# Patient Record
Sex: MALE | Race: WHITE | NOT HISPANIC OR LATINO | Employment: FULL TIME | URBAN - METROPOLITAN AREA
[De-identification: names, ages, dates, MRNs, and addresses within clinical notes are randomized per-mention and may not be internally consistent; named-entity substitution may affect disease eponyms.]

---

## 2017-01-13 ENCOUNTER — LAB VISIT (OUTPATIENT)
Dept: LAB | Facility: HOSPITAL | Age: 37
End: 2017-01-13
Attending: INTERNAL MEDICINE
Payer: OTHER GOVERNMENT

## 2017-01-13 ENCOUNTER — INITIAL CONSULT (OUTPATIENT)
Dept: GASTROENTEROLOGY | Facility: CLINIC | Age: 37
End: 2017-01-13
Payer: OTHER GOVERNMENT

## 2017-01-13 ENCOUNTER — PATIENT MESSAGE (OUTPATIENT)
Dept: GASTROENTEROLOGY | Facility: CLINIC | Age: 37
End: 2017-01-13

## 2017-01-13 VITALS — WEIGHT: 210.13 LBS | BODY MASS INDEX: 33.77 KG/M2 | HEIGHT: 66 IN

## 2017-01-13 DIAGNOSIS — K52.9 CHRONIC DIARRHEA: ICD-10-CM

## 2017-01-13 DIAGNOSIS — R74.01 ELEVATED ALT MEASUREMENT: ICD-10-CM

## 2017-01-13 DIAGNOSIS — K52.9 CHRONIC DIARRHEA: Primary | ICD-10-CM

## 2017-01-13 LAB
ALBUMIN SERPL BCP-MCNC: 3.9 G/DL
ALP SERPL-CCNC: 81 U/L
ALT SERPL W/O P-5'-P-CCNC: 46 U/L
ANION GAP SERPL CALC-SCNC: 11 MMOL/L
AST SERPL-CCNC: 28 U/L
BILIRUB SERPL-MCNC: 1.3 MG/DL
BUN SERPL-MCNC: 14 MG/DL
CALCIUM SERPL-MCNC: 9.2 MG/DL
CHLORIDE SERPL-SCNC: 104 MMOL/L
CO2 SERPL-SCNC: 26 MMOL/L
CREAT SERPL-MCNC: 1.2 MG/DL
EST. GFR  (AFRICAN AMERICAN): >60 ML/MIN/1.73 M^2
EST. GFR  (NON AFRICAN AMERICAN): >60 ML/MIN/1.73 M^2
GLUCOSE SERPL-MCNC: 71 MG/DL
POTASSIUM SERPL-SCNC: 4 MMOL/L
PROT SERPL-MCNC: 7.4 G/DL
SODIUM SERPL-SCNC: 141 MMOL/L
T4 FREE SERPL-MCNC: 0.87 NG/DL
TSH SERPL DL<=0.005 MIU/L-ACNC: 0.39 UIU/ML

## 2017-01-13 PROCEDURE — 84443 ASSAY THYROID STIM HORMONE: CPT

## 2017-01-13 PROCEDURE — 86706 HEP B SURFACE ANTIBODY: CPT

## 2017-01-13 PROCEDURE — 80053 COMPREHEN METABOLIC PANEL: CPT

## 2017-01-13 PROCEDURE — 86790 VIRUS ANTIBODY NOS: CPT

## 2017-01-13 PROCEDURE — 36415 COLL VENOUS BLD VENIPUNCTURE: CPT

## 2017-01-13 PROCEDURE — 86704 HEP B CORE ANTIBODY TOTAL: CPT

## 2017-01-13 PROCEDURE — 86709 HEPATITIS A IGM ANTIBODY: CPT

## 2017-01-13 PROCEDURE — 86803 HEPATITIS C AB TEST: CPT

## 2017-01-13 PROCEDURE — 84439 ASSAY OF FREE THYROXINE: CPT

## 2017-01-13 PROCEDURE — 99214 OFFICE O/P EST MOD 30 MIN: CPT | Mod: S$PBB,,, | Performed by: INTERNAL MEDICINE

## 2017-01-13 PROCEDURE — 99213 OFFICE O/P EST LOW 20 MIN: CPT | Mod: PBBFAC | Performed by: INTERNAL MEDICINE

## 2017-01-13 PROCEDURE — 99999 PR PBB SHADOW E&M-EST. PATIENT-LVL III: CPT | Mod: PBBFAC,,, | Performed by: INTERNAL MEDICINE

## 2017-01-13 PROCEDURE — 86705 HEP B CORE ANTIBODY IGM: CPT

## 2017-01-13 RX ORDER — SODIUM, POTASSIUM,MAG SULFATES 17.5-3.13G
1 SOLUTION, RECONSTITUTED, ORAL ORAL AS DIRECTED
Qty: 1 BOTTLE | Refills: 0 | Status: ON HOLD | OUTPATIENT
Start: 2017-01-13 | End: 2017-01-23 | Stop reason: HOSPADM

## 2017-01-13 NOTE — PROGRESS NOTES
Subjective:    PCP: Janice Vlaverde MD    Referring physician: Apoorva Britt      Patient ID: David Petersen is a 36 y.o. male.    Chief Complaint: Diarrhea and Abdominal Cramping      HPI   David Petersen is a 36 y.o. /White male here today for diarrhea    Diarrhea  Patient complains of diarrhea. Symptoms have been present for approximately several years. The symptoms are unchanged. Stool frequency is approximately 5 per day. Patient estimates stool volume to be 1-2 cups per bowel movement. Diarrhea does occur at night. The patient has noted no bleeding associated with bowel movements. The also patient reports the following symptoms: abdominal cramping relieved by defecation. The patient denies the following symptoms: abdominal distension, borborygmi, fecal incontinence, fever, greasy stool, joint aches, mucus with stools and weight loss. The patient currently denies significant abdominal pain or discomfort.   Relationship to food: the diarrhea is made worse by foods containing: all foods. Relationship to medications: the patient denies any relationship to medications. Other risk factors: emotional stress. Therapy tried so far: OTC antidiarrheal: Imodium at times with response, results good. Work up so far: stool for occult blood, result: negative, stool cultures negative, fecal leukocytes negative (2014). Celiac disease panel in 2014 was negative     He has mildly elevated ALT on his labs. He is overweight. No IVDA, tattoos, blood transfusions. Denies family history of liver disease, prior surgery   Is . No OTC herbals or green tea  Rare alcohol intake.   No known family history of IBD, celiac disease or GI malignancy.        Past Medical History   Diagnosis Date    Anxiety     Arthritis     Chronic diarrhea 2/11/2014    Jumper's knee        Past Surgical History   Procedure Laterality Date    Tonsillectomy         Family History   Problem Relation Age of Onset    Cancer Mother      Diabetes Maternal Grandmother     Diabetes Maternal Grandfather     Diabetes Paternal Grandmother     Diabetes Paternal Grandfather        Social History     Social History    Marital status:      Spouse name: N/A    Number of children: N/A    Years of education: N/A     Occupational History     [employer]     Social History Main Topics    Smoking status: Never Smoker    Smokeless tobacco: Never Used    Alcohol use Yes    Drug use: No    Sexual activity: Yes     Partners: Female     Birth control/ protection: None     Other Topics Concern    None     Social History Narrative       Review of patient's allergies indicates:   Allergen Reactions    Penicillins        Current Outpatient Prescriptions   Medication Sig Dispense Refill    alprazolam (XANAX) 0.5 MG tablet TAKE 1 TABLET BY MOUTH AS NEEDED FOR ANXIETY  2    buPROPion (WELLBUTRIN XL) 150 MG TB24 tablet Take 150 mg by mouth once daily.  2    sertraline (ZOLOFT) 50 MG tablet TAKE 1 TABLET BY MOUTH EVERY DAY WITH 100MG  2    azithromycin (Z-CARLOS) 250 MG tablet Follow instructions on pack. 6 tablet 0    ondansetron (ZOFRAN-ODT) 8 MG TbDL Take 1 tablet (8 mg total) by mouth 3 (three) times daily. 15 tablet 0    potassium chloride SA (K-DUR,KLOR-CON) 20 MEQ tablet Take 1 tab po once daily for 3 days 3 tablet 0     No current facility-administered medications for this visit.        Review of Systems   Constitutional: Negative for activity change, appetite change, chills, diaphoresis, fatigue and fever.   HENT: Negative for congestion, ear pain, facial swelling, mouth sores, nosebleeds, rhinorrhea, sore throat and voice change.    Eyes: Negative for photophobia, pain, discharge, redness and visual disturbance.   Respiratory: Negative for apnea, cough, choking, chest tightness, shortness of breath and wheezing.    Cardiovascular: Negative for chest pain, palpitations and leg swelling.   Gastrointestinal:        As per HPI   Genitourinary: Negative for  "decreased urine volume, difficulty urinating, dysuria, frequency and hematuria.   Musculoskeletal: Negative for arthralgias, back pain, myalgias, neck pain and neck stiffness.   Skin: Negative for pallor and rash.   Neurological: Negative for tremors, seizures, syncope, weakness and headaches.   Hematological: Negative for adenopathy. Does not bruise/bleed easily.   Psychiatric/Behavioral: Negative for behavioral problems, confusion, hallucinations and sleep disturbance. The patient is not nervous/anxious.        Objective:     Visit Vitals    Ht 5' 6" (1.676 m)    Wt 95.3 kg (210 lb 1.6 oz)    BMI 33.91 kg/m2     Wt Readings from Last 1 Encounters:   01/13/17 1014 95.3 kg (210 lb 1.6 oz)       Physical Exam   Constitutional: He is oriented to person, place, and time. He appears well-developed and well-nourished. No distress.   HENT:   Head: Normocephalic and atraumatic.   Mouth/Throat: Oropharynx is clear and moist.   Eyes: Conjunctivae and EOM are normal. Pupils are equal, round, and reactive to light. No scleral icterus.   Neck: Normal range of motion. Neck supple.   Cardiovascular: Normal rate, regular rhythm and normal heart sounds.    Pulmonary/Chest: Effort normal.   Abdominal: Soft. Bowel sounds are normal. He exhibits no distension and no mass. There is no tenderness. There is no rebound and no guarding. No hernia.   Musculoskeletal: Normal range of motion. He exhibits no edema.   Lymphadenopathy:     He has no cervical adenopathy.   Neurological: He is alert and oriented to person, place, and time. He has normal reflexes.   Skin: Skin is warm and dry. No rash noted. He is not diaphoretic. No erythema. No pallor.   Psychiatric: He has a normal mood and affect. His behavior is normal. Judgment and thought content normal.       Lab Results   Component Value Date    WBC 7.50 09/28/2016    HGB 15.2 09/28/2016    HCT 45.5 09/28/2016    MCV 90 09/28/2016     09/28/2016       Chemistry        Component " Value Date/Time     11/04/2016 1337    K 3.3 (L) 11/04/2016 1337     11/04/2016 1337    CO2 27 11/04/2016 1337    BUN 9 11/04/2016 1337    CREATININE 1.1 11/04/2016 1337    GLU 68 (L) 11/04/2016 1337        Component Value Date/Time    CALCIUM 8.8 11/04/2016 1337    ALKPHOS 71 09/28/2016 1128    AST 30 09/28/2016 1128    ALT 55 (H) 09/28/2016 1128    BILITOT 0.6 09/28/2016 1128          No results found for: APTT  No results found for: INR, PROTIME    No components found for: [id number]    No results found for: TSH  No results found for: HGBA1C    Lab Results   Component Value Date    AMYLASE 36 09/28/2016     Lab Results   Component Value Date    LIPASE 16 09/28/2016       No results found for: HAV, HEPAIGM, HEPBIGM, HEPBCAB, HBEAG, HEPCAB  No results found for: PPD    No results found for: OCCULTBLOOD    No results found for: IRON, TIBC, FERRITIN, SATURATEDIRO    Assessment:      1. Chronic diarrhea    2. Elevated ALT measurement     Likely IBS, diarrhea predominant       Plan:     Chronic diarrhea  -     Stool Exam-Ova,Cysts,Parasites; Future; Expected date: 1/13/17  -     Clostridium difficile EIA; Future; Expected date: 1/13/17  -     WBC, Stool; Future; Expected date: 1/13/17  -     TSH; Future; Expected date: 1/13/17  -     Stool culture; Future; Expected date: 1/13/17  -     Case request GI: COLONOSCOPY  -     sodium,potassium,mag sulfates (SUPREP BOWEL PREP KIT) 17.5-3.13-1.6 gram SolR; Take 1 kit by mouth as directed. For colonoscopy preparation  Dispense: 1 Bottle; Refill: 0    Elevated ALT measurement  -     Comprehensive metabolic panel; Future; Expected date: 1/13/17  -     US Abdomen Complete; Future; Expected date: 1/13/17  -     HEPATITIS B CORE ANTIBODY, TOTAL; Future; Expected date: 1/13/17  -     HEPATITIS B CORE ANTIBODY, IGM; Future; Expected date: 1/13/17  -     HEPATITIS B SURFACE ANTIBODY; Future; Expected date: 1/13/17  -     HEPATITIS C ANTIBODY; Future; Expected date: 1/13/17  -      HEPATITIS A ANTIBODY, IGM; Future; Expected date: 1/13/17  -     Hepatitis A antibody, IgG; Future; Expected date: 1/13/17          Return in about 6 weeks (around 2/24/2017).      Adrianna Hicks MD

## 2017-01-13 NOTE — LETTER
January 23, 2017      Apoorva Britt NP  [address] [facility]  1st Floor  [facility] [address]           [facility]  [address] [place] [id number]  Phone: [phone]  Fax: [phone]          Patient: David Petersen   MR Number: [id number]   YOB: 1980   Date of Visit: 1/13/2017       Dear Apoorva Britt:    Thank you for referring David Petersen to me for evaluation. Attached you will find relevant portions of my assessment and plan of care.    If you have questions, please do not hesitate to call me. I look forward to following David Petersen along with you.    Sincerely,    Adrianna Hicks MD    Enclosure  CC:  No Recipients    If you would like to receive this communication electronically, please contact [email or web address] or [phone] to request more information on [employer] access.    For providers and/or their staff who would like to refer a patient to [facility], please contact us through our one-stop-shop provider referral line, [facility], at [phone].    If you feel you have received this communication in error or would no longer like to receive these types of communications, please e-mail [email or web address]

## 2017-01-16 ENCOUNTER — HOSPITAL ENCOUNTER (OUTPATIENT)
Dept: RADIOLOGY | Facility: HOSPITAL | Age: 37
Discharge: HOME | End: 2017-01-16
Attending: INTERNAL MEDICINE
Payer: OTHER GOVERNMENT

## 2017-01-16 DIAGNOSIS — R74.01 ELEVATED ALT MEASUREMENT: ICD-10-CM

## 2017-01-16 LAB
HAV IGM SERPL QL IA: NEGATIVE
HBV CORE AB SERPL QL IA: NEGATIVE
HBV CORE IGM SERPL QL IA: NEGATIVE
HBV SURFACE AB SER-ACNC: NEGATIVE M[IU]/ML
HCV AB SERPL QL IA: NEGATIVE
HEPATITIS A ANTIBODY, IGG: POSITIVE

## 2017-01-16 PROCEDURE — 76700 US EXAM ABDOM COMPLETE: CPT | Mod: 26,,, | Performed by: RADIOLOGY

## 2017-01-16 PROCEDURE — 76700 US EXAM ABDOM COMPLETE: CPT | Mod: TC,PO

## 2017-01-17 ENCOUNTER — PATIENT MESSAGE (OUTPATIENT)
Dept: GASTROENTEROLOGY | Facility: CLINIC | Age: 37
End: 2017-01-17

## 2017-01-18 DIAGNOSIS — R19.7 DIARRHEA, UNSPECIFIED TYPE: Primary | ICD-10-CM

## 2017-01-20 ENCOUNTER — TELEPHONE (OUTPATIENT)
Dept: GASTROENTEROLOGY | Facility: CLINIC | Age: 37
End: 2017-01-20

## 2017-01-20 NOTE — TELEPHONE ENCOUNTER
----- Message from Adrianna Hicks MD sent at 1/18/2017  8:44 AM CST -----  Please schedule labs T3/T4 for the day he comes for colonoscopy. Thanks

## 2017-01-20 NOTE — TELEPHONE ENCOUNTER
Pt notified of lab appt at [facility] on 1/23/17 before Colonoscopy. Pt verbalized understanding.

## 2017-01-23 ENCOUNTER — ANESTHESIA (OUTPATIENT)
Dept: ENDOSCOPY | Facility: HOSPITAL | Age: 37
End: 2017-01-23
Payer: OTHER GOVERNMENT

## 2017-01-23 ENCOUNTER — HOSPITAL ENCOUNTER (OUTPATIENT)
Facility: HOSPITAL | Age: 37
Discharge: HOME | End: 2017-01-23
Attending: INTERNAL MEDICINE | Admitting: INTERNAL MEDICINE
Payer: OTHER GOVERNMENT

## 2017-01-23 ENCOUNTER — SURGERY (OUTPATIENT)
Age: 37
End: 2017-01-23

## 2017-01-23 ENCOUNTER — ANESTHESIA EVENT (OUTPATIENT)
Dept: ENDOSCOPY | Facility: HOSPITAL | Age: 37
End: 2017-01-23
Payer: OTHER GOVERNMENT

## 2017-01-23 VITALS
BODY MASS INDEX: 33.27 KG/M2 | RESPIRATION RATE: 19 BRPM | HEART RATE: 72 BPM | DIASTOLIC BLOOD PRESSURE: 70 MMHG | HEIGHT: 66 IN | WEIGHT: 207 LBS | TEMPERATURE: 98 F | SYSTOLIC BLOOD PRESSURE: 111 MMHG | OXYGEN SATURATION: 98 % | RESPIRATION RATE: 14 BRPM

## 2017-01-23 DIAGNOSIS — K52.9 CHRONIC DIARRHEA: ICD-10-CM

## 2017-01-23 PROCEDURE — 88305 TISSUE EXAM BY PATHOLOGIST: CPT | Performed by: PATHOLOGY

## 2017-01-23 PROCEDURE — 37000008 HC ANESTHESIA 1ST 15 MINUTES: Performed by: INTERNAL MEDICINE

## 2017-01-23 PROCEDURE — 27201012 HC FORCEPS, HOT/COLD, DISP: Performed by: INTERNAL MEDICINE

## 2017-01-23 PROCEDURE — 63600175 PHARM REV CODE 636 W HCPCS: Performed by: NURSE ANESTHETIST, CERTIFIED REGISTERED

## 2017-01-23 PROCEDURE — 45380 COLONOSCOPY AND BIOPSY: CPT | Performed by: INTERNAL MEDICINE

## 2017-01-23 PROCEDURE — 25000003 PHARM REV CODE 250: Performed by: NURSE ANESTHETIST, CERTIFIED REGISTERED

## 2017-01-23 PROCEDURE — 25000003 PHARM REV CODE 250: Performed by: INTERNAL MEDICINE

## 2017-01-23 PROCEDURE — 88305 TISSUE EXAM BY PATHOLOGIST: CPT | Mod: 26,,, | Performed by: PATHOLOGY

## 2017-01-23 PROCEDURE — 37000009 HC ANESTHESIA EA ADD 15 MINS: Performed by: INTERNAL MEDICINE

## 2017-01-23 PROCEDURE — 45380 COLONOSCOPY AND BIOPSY: CPT | Mod: ,,, | Performed by: INTERNAL MEDICINE

## 2017-01-23 RX ORDER — SODIUM CHLORIDE, SODIUM LACTATE, POTASSIUM CHLORIDE, CALCIUM CHLORIDE 600; 310; 30; 20 MG/100ML; MG/100ML; MG/100ML; MG/100ML
INJECTION, SOLUTION INTRAVENOUS CONTINUOUS PRN
Status: DISCONTINUED | OUTPATIENT
Start: 2017-01-23 | End: 2017-01-23

## 2017-01-23 RX ORDER — SODIUM CHLORIDE, SODIUM LACTATE, POTASSIUM CHLORIDE, CALCIUM CHLORIDE 600; 310; 30; 20 MG/100ML; MG/100ML; MG/100ML; MG/100ML
INJECTION, SOLUTION INTRAVENOUS CONTINUOUS
Status: DISCONTINUED | OUTPATIENT
Start: 2017-01-23 | End: 2017-01-23 | Stop reason: HOSPADM

## 2017-01-23 RX ORDER — LIDOCAINE HCL/PF 100 MG/5ML
SYRINGE (ML) INTRAVENOUS
Status: DISCONTINUED | OUTPATIENT
Start: 2017-01-23 | End: 2017-01-23

## 2017-01-23 RX ORDER — PROPOFOL 10 MG/ML
INJECTION, EMULSION INTRAVENOUS
Status: DISCONTINUED | OUTPATIENT
Start: 2017-01-23 | End: 2017-01-23

## 2017-01-23 RX ADMIN — SODIUM CHLORIDE, SODIUM LACTATE, POTASSIUM CHLORIDE, AND CALCIUM CHLORIDE: .6; .31; .03; .02 INJECTION, SOLUTION INTRAVENOUS at 11:01

## 2017-01-23 RX ADMIN — PROPOFOL 40 MG: 10 INJECTION, EMULSION INTRAVENOUS at 02:01

## 2017-01-23 RX ADMIN — SODIUM CHLORIDE, SODIUM LACTATE, POTASSIUM CHLORIDE, AND CALCIUM CHLORIDE: 600; 310; 30; 20 INJECTION, SOLUTION INTRAVENOUS at 01:01

## 2017-01-23 RX ADMIN — PROPOFOL 60 MG: 10 INJECTION, EMULSION INTRAVENOUS at 01:01

## 2017-01-23 RX ADMIN — PROPOFOL 40 MG: 10 INJECTION, EMULSION INTRAVENOUS at 01:01

## 2017-01-23 RX ADMIN — LIDOCAINE HYDROCHLORIDE 50 MG: 20 INJECTION, SOLUTION INTRAVENOUS at 01:01

## 2017-01-23 RX ADMIN — PROPOFOL 140 MG: 10 INJECTION, EMULSION INTRAVENOUS at 01:01

## 2017-01-23 NOTE — H&P (VIEW-ONLY)
Subjective:    PCP: Janice Valverde MD    Referring physician: Apoorva Britt      Patient ID: David Petersen is a 36 y.o. male.    Chief Complaint: Diarrhea and Abdominal Cramping      HPI   David Petersen is a 36 y.o. /White male here today for diarrhea    Diarrhea  Patient complains of diarrhea. Symptoms have been present for approximately several years. The symptoms are unchanged. Stool frequency is approximately 5 per day. Patient estimates stool volume to be 1-2 cups per bowel movement. Diarrhea does occur at night. The patient has noted no bleeding associated with bowel movements. The also patient reports the following symptoms: abdominal cramping relieved by defecation. The patient denies the following symptoms: abdominal distension, borborygmi, fecal incontinence, fever, greasy stool, joint aches, mucus with stools and weight loss. The patient currently denies significant abdominal pain or discomfort.   Relationship to food: the diarrhea is made worse by foods containing: all foods. Relationship to medications: the patient denies any relationship to medications. Other risk factors: emotional stress. Therapy tried so far: OTC antidiarrheal: Imodium at times with response, results good. Work up so far: stool for occult blood, result: negative, stool cultures negative, fecal leukocytes negative (2014). Celiac disease panel in 2014 was negative     He has mildly elevated ALT on his labs. He is overweight. No IVDA, tattoos, blood transfusions. Denies family history of liver disease, prior surgery   Is . No OTC herbals or green tea  Rare alcohol intake.   No known family history of IBD, celiac disease or GI malignancy.        Past Medical History   Diagnosis Date    Anxiety     Arthritis     Chronic diarrhea 2/11/2014    Jumper's knee        Past Surgical History   Procedure Laterality Date    Tonsillectomy         Family History   Problem Relation Age of Onset    Cancer Mother      Diabetes Maternal Grandmother     Diabetes Maternal Grandfather     Diabetes Paternal Grandmother     Diabetes Paternal Grandfather        Social History     Social History    Marital status:      Spouse name: N/A    Number of children: N/A    Years of education: N/A     Occupational History     [employer]     Social History Main Topics    Smoking status: Never Smoker    Smokeless tobacco: Never Used    Alcohol use Yes    Drug use: No    Sexual activity: Yes     Partners: Female     Birth control/ protection: None     Other Topics Concern    None     Social History Narrative       Review of patient's allergies indicates:   Allergen Reactions    Penicillins        Current Outpatient Prescriptions   Medication Sig Dispense Refill    alprazolam (XANAX) 0.5 MG tablet TAKE 1 TABLET BY MOUTH AS NEEDED FOR ANXIETY  2    buPROPion (WELLBUTRIN XL) 150 MG TB24 tablet Take 150 mg by mouth once daily.  2    sertraline (ZOLOFT) 50 MG tablet TAKE 1 TABLET BY MOUTH EVERY DAY WITH 100MG  2    azithromycin (Z-CARLOS) 250 MG tablet Follow instructions on pack. 6 tablet 0    ondansetron (ZOFRAN-ODT) 8 MG TbDL Take 1 tablet (8 mg total) by mouth 3 (three) times daily. 15 tablet 0    potassium chloride SA (K-DUR,KLOR-CON) 20 MEQ tablet Take 1 tab po once daily for 3 days 3 tablet 0     No current facility-administered medications for this visit.        Review of Systems   Constitutional: Negative for activity change, appetite change, chills, diaphoresis, fatigue and fever.   HENT: Negative for congestion, ear pain, facial swelling, mouth sores, nosebleeds, rhinorrhea, sore throat and voice change.    Eyes: Negative for photophobia, pain, discharge, redness and visual disturbance.   Respiratory: Negative for apnea, cough, choking, chest tightness, shortness of breath and wheezing.    Cardiovascular: Negative for chest pain, palpitations and leg swelling.   Gastrointestinal:        As per HPI   Genitourinary: Negative for  "decreased urine volume, difficulty urinating, dysuria, frequency and hematuria.   Musculoskeletal: Negative for arthralgias, back pain, myalgias, neck pain and neck stiffness.   Skin: Negative for pallor and rash.   Neurological: Negative for tremors, seizures, syncope, weakness and headaches.   Hematological: Negative for adenopathy. Does not bruise/bleed easily.   Psychiatric/Behavioral: Negative for behavioral problems, confusion, hallucinations and sleep disturbance. The patient is not nervous/anxious.        Objective:     Visit Vitals    Ht 5' 6" (1.676 m)    Wt 95.3 kg (210 lb 1.6 oz)    BMI 33.91 kg/m2     Wt Readings from Last 1 Encounters:   01/13/17 1014 95.3 kg (210 lb 1.6 oz)       Physical Exam   Constitutional: He is oriented to person, place, and time. He appears well-developed and well-nourished. No distress.   HENT:   Head: Normocephalic and atraumatic.   Mouth/Throat: Oropharynx is clear and moist.   Eyes: Conjunctivae and EOM are normal. Pupils are equal, round, and reactive to light. No scleral icterus.   Neck: Normal range of motion. Neck supple.   Cardiovascular: Normal rate, regular rhythm and normal heart sounds.    Pulmonary/Chest: Effort normal.   Abdominal: Soft. Bowel sounds are normal. He exhibits no distension and no mass. There is no tenderness. There is no rebound and no guarding. No hernia.   Musculoskeletal: Normal range of motion. He exhibits no edema.   Lymphadenopathy:     He has no cervical adenopathy.   Neurological: He is alert and oriented to person, place, and time. He has normal reflexes.   Skin: Skin is warm and dry. No rash noted. He is not diaphoretic. No erythema. No pallor.   Psychiatric: He has a normal mood and affect. His behavior is normal. Judgment and thought content normal.       Lab Results   Component Value Date    WBC 7.50 09/28/2016    HGB 15.2 09/28/2016    HCT 45.5 09/28/2016    MCV 90 09/28/2016     09/28/2016       Chemistry        Component " Value Date/Time     11/04/2016 1337    K 3.3 (L) 11/04/2016 1337     11/04/2016 1337    CO2 27 11/04/2016 1337    BUN 9 11/04/2016 1337    CREATININE 1.1 11/04/2016 1337    GLU 68 (L) 11/04/2016 1337        Component Value Date/Time    CALCIUM 8.8 11/04/2016 1337    ALKPHOS 71 09/28/2016 1128    AST 30 09/28/2016 1128    ALT 55 (H) 09/28/2016 1128    BILITOT 0.6 09/28/2016 1128          No results found for: APTT  No results found for: INR, PROTIME    No components found for: [id number]    No results found for: TSH  No results found for: HGBA1C    Lab Results   Component Value Date    AMYLASE 36 09/28/2016     Lab Results   Component Value Date    LIPASE 16 09/28/2016       No results found for: HAV, HEPAIGM, HEPBIGM, HEPBCAB, HBEAG, HEPCAB  No results found for: PPD    No results found for: OCCULTBLOOD    No results found for: IRON, TIBC, FERRITIN, SATURATEDIRO    Assessment:      1. Chronic diarrhea    2. Elevated ALT measurement     Likely IBS, diarrhea predominant       Plan:     Chronic diarrhea  -     Stool Exam-Ova,Cysts,Parasites; Future; Expected date: 1/13/17  -     Clostridium difficile EIA; Future; Expected date: 1/13/17  -     WBC, Stool; Future; Expected date: 1/13/17  -     TSH; Future; Expected date: 1/13/17  -     Stool culture; Future; Expected date: 1/13/17  -     Case request GI: COLONOSCOPY  -     sodium,potassium,mag sulfates (SUPREP BOWEL PREP KIT) 17.5-3.13-1.6 gram SolR; Take 1 kit by mouth as directed. For colonoscopy preparation  Dispense: 1 Bottle; Refill: 0    Elevated ALT measurement  -     Comprehensive metabolic panel; Future; Expected date: 1/13/17  -     US Abdomen Complete; Future; Expected date: 1/13/17  -     HEPATITIS B CORE ANTIBODY, TOTAL; Future; Expected date: 1/13/17  -     HEPATITIS B CORE ANTIBODY, IGM; Future; Expected date: 1/13/17  -     HEPATITIS B SURFACE ANTIBODY; Future; Expected date: 1/13/17  -     HEPATITIS C ANTIBODY; Future; Expected date: 1/13/17  -      HEPATITIS A ANTIBODY, IGM; Future; Expected date: 1/13/17  -     Hepatitis A antibody, IgG; Future; Expected date: 1/13/17          Return in about 6 weeks (around 2/24/2017).      Adrianna Hicks MD

## 2017-01-23 NOTE — BRIEF OP NOTE
[facility]  Brief Operative Note     SUMMARY     Surgery Date: 1/23/2017     Surgeon(s) and Role:     * Adrianna Hicks MD - Primary    Assisting Surgeon: None    Pre-op Diagnosis:  Chronic diarrhea [K52.9]    Post-op Diagnosis:  Post-Op Diagnosis Codes:     * Chronic diarrhea [K52.9]    Procedure(s) (LRB):  COLONOSCOPY (N/A)    Anesthesia: Monitor Anesthesia Care    Description of the findings of the procedure: Procedure completed. See Procedure note for details.     Findings/Key Components:  Procedure completed. See Procedure note for details.     Prosthetic/Devices: None    Estimated Blood Loss: None         Specimens:   Specimen (12h ago through future)    Start     Ordered    01/23/17 1359  Specimen to Pathology - Surgery  Once     Comments:  Jar 1 random colon bx for diarrhea    01/23/17 1405          Discharge Note    SUMMARY     Admit Date: 1/23/2017    Discharge Date and Time: 1/23/2017    Hospital Course (synopsis of major diagnoses, care, treatment, and services provided during the course of the hospital stay): Procedure completed. See Procedure note for details.     Final Diagnosis: Post-Op Diagnosis Codes:     * Chronic diarrhea [K52.9]    Disposition: Discharge home when discharge criteria met.    Follow Up/Patient Instructions: With primary care physician as previously scheduled.    Medications:  Reconciled Home Medications: Current Discharge Medication List      CONTINUE these medications which have NOT CHANGED    Details   alprazolam (XANAX) 0.5 MG tablet TAKE 1 TABLET BY MOUTH AS NEEDED FOR ANXIETY  Refills: 2      azithromycin (Z-CARLOS) 250 MG tablet Follow instructions on pack.  Qty: 6 tablet, Refills: 0      buPROPion (WELLBUTRIN XL) 150 MG TB24 tablet Take 150 mg by mouth once daily.  Refills: 2      ondansetron (ZOFRAN-ODT) 8 MG TbDL Take 1 tablet (8 mg total) by mouth 3 (three) times daily.  Qty: 15 tablet, Refills: 0      potassium chloride SA (K-DUR,KLOR-CON) 20 MEQ  tablet Take 1 tab po once daily for 3 days  Qty: 3 tablet, Refills: 0    Associated Diagnoses: Hypokalemia      sertraline (ZOLOFT) 50 MG tablet TAKE 1 TABLET BY MOUTH EVERY DAY WITH 100MG  Refills: 2         STOP taking these medications       sodium,potassium,mag sulfates (SUPREP BOWEL PREP KIT) 17.5-3.13-1.6 gram SolR Comments:   Reason for Stopping:               Discharge Procedure Orders  Diet general     Activity as tolerated       Follow-up Information     Follow up with Janice Valverde MD.    Specialty:  Family Medicine    Contact information:    [address] [address] [address] [phone]

## 2017-01-23 NOTE — ANESTHESIA POSTPROCEDURE EVALUATION
"Anesthesia Post Evaluation    Patient: David Petersen    Procedure(s) Performed: Procedure(s) (LRB):  COLONOSCOPY (N/A)    Final Anesthesia Type: MAC  Patient location during evaluation: PACU  Patient participation: Yes- Able to Participate  Level of consciousness: awake and alert and oriented  Post-procedure vital signs: reviewed and stable  Pain management: adequate  Airway patency: patent  PONV status at discharge: No PONV  Anesthetic complications: no      Cardiovascular status: blood pressure returned to baseline  Respiratory status: unassisted, room air and spontaneous ventilation  Hydration status: euvolemic  Follow-up not needed.        Visit Vitals    BP (!) 102/45 (BP Location: Left arm, Patient Position: Lying, BP Method: Automatic)    Pulse 92    Temp 36.7 °C (98 °F) (Oral)    Resp 12    Ht 5' 6" (1.676 m)    Wt 93.9 kg (207 lb)    SpO2 98%    BMI 33.41 kg/m2       Pain/Vinh Score: Pain Assessment Performed: Yes (1/23/2017  2:08 PM)  Presence of Pain: denies (1/23/2017  2:08 PM)      "

## 2017-01-23 NOTE — ANESTHESIA PREPROCEDURE EVALUATION
01/23/2017  David Petersen is a 36 y.o., male.    [facility] Anesthesia Evaluation    I have reviewed the Patient Summary Reports.    I have reviewed the Nursing Notes.   I have reviewed the Medications.     Review of Systems  Anesthesia Hx:  No previous Anesthesia    Social:  Non-Smoker, Social Alcohol Use    Hematology/Oncology:  Hematology Normal   Oncology Normal     EENT/Dental:EENT/Dental Normal   Cardiovascular:   Exercise tolerance: good hyperlipidemia    Pulmonary:   Sleep Apnea Snore   Renal/:  Renal/ Normal     Hepatic/GI:  Hepatic/GI Normal Bowel Prep. Fatty liver  0830 last drink of fluid.   Musculoskeletal:  Musculoskeletal Normal    Endocrine:  Endocrine Normal    Dermatological:  Skin Normal    Psych:   Psychiatric History          Physical Exam  General:  Well nourished, Obesity    Airway/Jaw/Neck:  Airway Findings: Mallampati: III                Anesthesia Plan  Type of Anesthesia, risks & benefits discussed:  Anesthesia Type:  MAC  Patient's Preference:   Intra-op Monitoring Plan:   Intra-op Monitoring Plan Comments:   Post Op Pain Control Plan:   Post Op Pain Control Plan Comments:   Induction:   IV  Beta Blocker:  Patient is not currently on a Beta-Blocker (No further documentation required).       Informed Consent: Patient understands risks and agrees with Anesthesia plan.  Questions answered. Anesthesia consent signed with patient.  ASA Score: 2     Day of Surgery Review of History & Physical: I have interviewed and examined the patient. I have reviewed the patient's H&P dated: 01/23/17. There are no significant changes.  H&P update referred to the provider.         Ready For Surgery From Anesthesia Perspective.

## 2017-01-23 NOTE — TRANSFER OF CARE
"Anesthesia Transfer of Care Note    Patient: David Petersen    Procedure(s) Performed: Procedure(s) (LRB):  COLONOSCOPY (N/A)    Patient location: PACU    Anesthesia Type: MAC    Transport from OR: Transported from OR on room air with adequate spontaneous ventilation    Post pain: adequate analgesia    Post assessment: no apparent anesthetic complications    Post vital signs: stable    Level of consciousness: awake and alert    Nausea/Vomiting: no nausea/vomiting    Complications: none          Last vitals:   Visit Vitals    BP (!) 102/45 (BP Location: Left arm, Patient Position: Lying, BP Method: Automatic)    Pulse 92    Temp 36.7 °C (98 °F) (Oral)    Resp 12    Ht 5' 6" (1.676 m)    Wt 93.9 kg (207 lb)    SpO2 98%    BMI 33.41 kg/m2     "

## 2017-01-23 NOTE — DISCHARGE INSTRUCTIONS
Diet and Lifestyle Tips for Irritable Bowel Syndrome (IBS)    Your healthcare professional may suggest some lifestyle changes to help control your IBS. Changing your diet and managing stress are two of the most important. Follow your healthcare providers instructions and try some of the suggestions below.  Change your diet  Your diet may be an important cause of IBS symptoms. You may want to try the following:  · Pay attention to what foods bother you, and avoid them. For example, dairy products are hard for some people to digest.  · Drink 6 to 8 glasses of water a day.  · Avoid caffeine and tobacco. These are muscle stimulants and can affect the working of your digestive tract.  · Avoid alcohol, which can irritate your digestive tract and make your symptoms worse.  · Eat more fiber if constipation is a problem. Fiber makes the stool softer and easier to pass through the colon.  Reduce stress  If stress or anxiety makes your IBS symptoms worse, learning how to manage stress may help you feel better. Try these tips:  · Identify the causes of stress in your life.  · Learn new ways to cope with them.  · Regular exercise is a great way to relieve stress. It can also help ease constipation.  © 7151-2100 [employer]. [address] [place] [address]. All rights reserved. This information is not intended as a substitute for professional medical care. Always follow your healthcare professional's instructions.        What is Irritable Bowel Syndrome (IBS)?  People who have irritable bowel syndrome (IBS) have digestive tracts that react abnormally to certain substances or to stress. This leads to symptoms like cramps, gas, bloating, pain, constipation, and diarrhea. Sometimes called spastic colon, IBS is a common condition that is not a disease, but rather a group of symptoms that happen together.     Muscle contraction moves food through the digestive tract.    IBS--a motility problem  The muscle  "movement that passes food through the digestive tract is called motility. When you have IBS, the normal motility of the digestive tract (especially the colon) is disrupted. Motility may speed up, slow down, or become irregular. If stool passes too quickly through the colon, not enough water is absorbed from it. Loose, watery stools (diarrhea) can result. If stool passes through the colon too slowly, too much water is absorbed and the stool becomes hard and dry (constipation). Also, stool and gas may back up and cause painful pressure and cramping. There is no single test that can diagnose IBS. It is a group of symptoms that help your healthcare provider with the diagnosis. Often multiple blood, stool, radiologic tests, or even colonoscopy are performed in the evaluation of people suspected to have IBS. These are done primarily to make sure that there are no other illnesses that can account for your symptoms.   What causes IBS?  A great deal of research has been done on IBS, but the cause is still not known. Some of the possible factors include:   · Smoking, eating certain foods, or drinking alcohol or caffeinated drinks can cause, or "trigger," symptoms of IBS.  · Although no one knows for sure, IBS may be caused by a problem with the nerves or muscles in your digestive tract.  · There is also some evidence that certain bacteria found after a severe gastroinstestinal infection in the small intestine and colon may cause IBS.  · While stress and anxiety worsen the symptoms of IBS, it is not believed to be the cause.   What you can do  Recommendations include:  · Certain medicines may help regulate the working of your digestive tract. Your healthcare provider may prescribe one or more for you.  · Medicine cant cure IBS, but it may help manage the symptoms.  · Because some medicines may make IBS worse, dont take any medicine, especially laxatives, unless your healthcare provider prescribes it for you.  · Your " healthcare provider may suggest some lifestyle changes to help control your IBS. Two of the most important are changing your diet and managing stress. If diet changes are suggested, ask for nutritional guidance from a dietitian so you maintain a healthy nutritional balance in your food intake.   © 0525-5431 [employer]. [address] [place] [address]. All rights reserved. This information is not intended as a substitute for professional medical care. Always follow your healthcare professional's instructions.

## 2017-01-23 NOTE — PLAN OF CARE
See anesthesia for meds, vs, and monitoring.Pt adequately sedated per all staff in procedure room. Final timeout done.

## 2017-01-23 NOTE — IP AVS SNAPSHOT
[address] [place] [address]           Patient Discharge Instructions     Our goal is to set you up for success. This packet includes information on your condition, medications, and your home care. It will help you to care for yourself so you don't get sicker and need to go back to the hospital.     Please ask your nurse if you have any questions.        There are many details to remember when preparing to leave the hospital. Here is what you will need to do:    1. Take your medicine. If you are prescribed medications, review your Medication List in the following pages. You may have new medications to  at the pharmacy and others that you'll need to stop taking. Review the instructions for how and when to take your medications. Talk with your doctor or nurses if you are unsure of what to do.     2. Go to your follow-up appointments. Specific follow-up information is listed in the following pages. Your may be contacted by a transition nurse or clinical provider about future appointments. Be sure we have all of the phone numbers to reach you, if needed. Please contact your provider's office if you are unable to make an appointment.     3. Watch for warning signs. Your doctor or nurse will give you detailed warning signs to watch for and when to call for assistance. These instructions may also include educational information about your condition. If you experience any of warning signs to your health, call your doctor.               ** Verify the list of medication(s) below is accurate and up to date. Carry this with you in case of emergency. If your medications have changed, please notify your healthcare provider.             Medication List      CONTINUE taking these medications        Additional Info                      alprazolam 0.5 MG tablet   Commonly known as:  XANAX   Refills:  2    Instructions:  TAKE 1 TABLET BY MOUTH AS NEEDED FOR ANXIETY     Begin  Date    AM    Noon    PM    Bedtime       buPROPion 150 MG TB24 tablet   Commonly known as:  WELLBUTRIN XL   Refills:  2   Dose:  150 mg    Instructions:  Take 150 mg by mouth once daily.     Begin Date    AM    Noon    PM    Bedtime       sertraline 50 MG tablet   Commonly known as:  ZOLOFT   Refills:  2    Instructions:  TAKE 1 TABLET BY MOUTH EVERY DAY WITH 100MG     Begin Date    AM    Noon    PM    Bedtime         STOP taking these medications     sodium,potassium,mag sulfates 17.5-3.13-1.6 gram Solr   Commonly known as:  SUPREP BOWEL PREP KIT         ASK your doctor about these medications        Additional Info                      azithromycin 250 MG tablet   Commonly known as:  Z-CARLOS   Quantity:  6 tablet   Refills:  0    Instructions:  Follow instructions on pack.     Begin Date    AM    Noon    PM    Bedtime       ondansetron 8 MG Tbdl   Commonly known as:  ZOFRAN-ODT   Quantity:  15 tablet   Refills:  0   Dose:  8 mg    Instructions:  Take 1 tablet (8 mg total) by mouth 3 (three) times daily.     Begin Date    AM    Noon    PM    Bedtime       potassium chloride SA 20 MEQ tablet   Commonly known as:  K-DUR,KLOR-CON   Quantity:  3 tablet   Refills:  0    Instructions:  Take 1 tab po once daily for 3 days     Begin Date    AM    Noon    PM    Bedtime                  Please bring to all follow up appointments:    1. A copy of your discharge instructions.  2. All medicines you are currently taking in their original bottles.  3. Identification and insurance card.    Please arrive 15 minutes ahead of scheduled appointment time.    Please call 24 hours in advance if you must reschedule your appointment and/or time.        Your Scheduled Appointments     Jan 23, 2017  3:30 PM CST   Non-Fasting Lab with [facility], [address] [facility]    [address] [facility] [id number] [phone]              Follow-up Information     Follow up with Janice LESTER  MD Abram.    Specialty:  Family Medicine    Contact information:    [address] [address] [address]  [phone]          Discharge Instructions     Future Orders    Activity as tolerated     Diet general     Questions:    Total calories:      Fat restriction, if any:      Protein restriction, if any:      Na restriction, if any:      Fluid restriction:      Additional restrictions:          Discharge Instructions         Diet and Lifestyle Tips for Irritable Bowel Syndrome (IBS)    Your healthcare professional may suggest some lifestyle changes to help control your IBS. Changing your diet and managing stress are two of the most important. Follow your healthcare providers instructions and try some of the suggestions below.  Change your diet  Your diet may be an important cause of IBS symptoms. You may want to try the following:  · Pay attention to what foods bother you, and avoid them. For example, dairy products are hard for some people to digest.  · Drink 6 to 8 glasses of water a day.  · Avoid caffeine and tobacco. These are muscle stimulants and can affect the working of your digestive tract.  · Avoid alcohol, which can irritate your digestive tract and make your symptoms worse.  · Eat more fiber if constipation is a problem. Fiber makes the stool softer and easier to pass through the colon.  Reduce stress  If stress or anxiety makes your IBS symptoms worse, learning how to manage stress may help you feel better. Try these tips:  · Identify the causes of stress in your life.  · Learn new ways to cope with them.  · Regular exercise is a great way to relieve stress. It can also help ease constipation.  © 3312-0807 [employer]. [address] [place] [address], [place] [address]. All rights reserved. This information is not intended as a substitute for professional medical care. Always follow your healthcare professional's instructions.        What is Irritable Bowel Syndrome  "(IBS)?  People who have irritable bowel syndrome (IBS) have digestive tracts that react abnormally to certain substances or to stress. This leads to symptoms like cramps, gas, bloating, pain, constipation, and diarrhea. Sometimes called spastic colon, IBS is a common condition that is not a disease, but rather a group of symptoms that happen together.     Muscle contraction moves food through the digestive tract.    IBS--a motility problem  The muscle movement that passes food through the digestive tract is called motility. When you have IBS, the normal motility of the digestive tract (especially the colon) is disrupted. Motility may speed up, slow down, or become irregular. If stool passes too quickly through the colon, not enough water is absorbed from it. Loose, watery stools (diarrhea) can result. If stool passes through the colon too slowly, too much water is absorbed and the stool becomes hard and dry (constipation). Also, stool and gas may back up and cause painful pressure and cramping. There is no single test that can diagnose IBS. It is a group of symptoms that help your healthcare provider with the diagnosis. Often multiple blood, stool, radiologic tests, or even colonoscopy are performed in the evaluation of people suspected to have IBS. These are done primarily to make sure that there are no other illnesses that can account for your symptoms.   What causes IBS?  A great deal of research has been done on IBS, but the cause is still not known. Some of the possible factors include:   · Smoking, eating certain foods, or drinking alcohol or caffeinated drinks can cause, or "trigger," symptoms of IBS.  · Although no one knows for sure, IBS may be caused by a problem with the nerves or muscles in your digestive tract.  · There is also some evidence that certain bacteria found after a severe gastroinstestinal infection in the small intestine and colon may cause IBS.  · While stress and anxiety worsen the " "symptoms of IBS, it is not believed to be the cause.   What you can do  Recommendations include:  · Certain medicines may help regulate the working of your digestive tract. Your healthcare provider may prescribe one or more for you.  · Medicine cant cure IBS, but it may help manage the symptoms.  · Because some medicines may make IBS worse, dont take any medicine, especially laxatives, unless your healthcare provider prescribes it for you.  · Your healthcare provider may suggest some lifestyle changes to help control your IBS. Two of the most important are changing your diet and managing stress. If diet changes are suggested, ask for nutritional guidance from a dietitian so you maintain a healthy nutritional balance in your food intake.   © 2154-8911 [employer]. [address] [place] [address], [place] [address]. All rights reserved. This information is not intended as a substitute for professional medical care. Always follow your healthcare professional's instructions.            Admission Information     Date & Time Provider Department CSN    1/23/2017 11:32 AM Adrianna Hicks MD [facility] -  [id number]      Care Providers     Provider Role Specialty Primary office phone    Adrianna Hicks MD Attending Provider Gastroenterology [phone]    Adrianna Hicks MD Surgeon  Gastroenterology [phone]      Your Vitals Were     BP Pulse Temp Resp Height Weight    102/45 (BP Location: Left arm, Patient Position: Lying, BP Method: Automatic) 92 98 °F (36.7 °C) (Oral) 12 5' 6" (1.676 m) 93.9 kg (207 lb)    SpO2 BMI             98% 33.41 kg/m2         Recent Lab Values     No lab values to display.      Pending Labs     Order Current Status    Specimen to Pathology - Surgery Collected (01/23/17 6201)      Allergies as of 1/23/2017        Reactions    Penicillins       [facility]     [facility] Care Line - 24/7 Assistance  Unless otherwise directed by your provider, " please contact [facility], our nurse care line that is available for 24/7 assistance.     Registered nurses in the [facility] provide clinical advisement, health education, appointment booking, and other advisory services.  Call for this free service at [phone].        Advance Directives     An advance directive is a document which, in the event you are no longer able to make decisions for yourself, tells your healthcare team what kind of treatment you do or do not want to receive, or who you would like to make those decisions for you.  If you do not currently have an advance directive, [facility] encourages you to create one.  For more information call:  [phone]), [phone] ([phone]),  or log on to [email or web address].        Language Assistance Services     ATTENTION: Language assistance services are available, free of charge. Please call [phone].      ATENCIÓN: Si habla español, tiene a watkins disposición servicios gratuitos de asistencia lingüística. Llame al [phone].     [facility] Ý: N?u b?n nói Ti?ng Vi?t, có các d?ch v? h? tr? ngôn ng? mi?n phí dành cho b?n. G?i s? [phone].         [facility] complies with applicable Federal civil rights laws and does not discriminate on the basis of race, color, national origin, age, disability, or sex.

## 2017-01-23 NOTE — ANESTHESIA RELEASE NOTE
"Anesthesia Release from PACU Note    Patient: David Petersen    Procedure(s) Performed: Procedure(s) (LRB):  COLONOSCOPY (N/A)    Anesthesia type: MAC    Post pain: Adequate analgesia    Post assessment: no apparent anesthetic complications, tolerated procedure well and no evidence of recall    Last Vitals:   Visit Vitals    BP (!) 102/45 (BP Location: Left arm, Patient Position: Lying, BP Method: Automatic)    Pulse 92    Temp 36.7 °C (98 °F) (Oral)    Resp 12    Ht 5' 6" (1.676 m)    Wt 93.9 kg (207 lb)    SpO2 98%    BMI 33.41 kg/m2       Post vital signs: stable    Level of consciousness: awake, alert  and oriented    Nausea/Vomiting: no nausea/no vomiting    Complications: none    Airway Patency: patent    Respiratory: unassisted, spontaneous ventilation, room air    Cardiovascular: stable    Hydration: euvolemic  "

## 2017-01-23 NOTE — INTERVAL H&P NOTE
The patient has been examined and the H&P has been reviewed:    I concur with the findings and no changes have occurred since H&P was written.    Anesthesia/Surgery risks, benefits and alternative options discussed and understood by patient/family.          Active Hospital Problems    Diagnosis  POA    Chronic diarrhea [K52.9]  Yes     Chronic      Resolved Hospital Problems    Diagnosis Date Resolved POA   No resolved problems to display.

## 2017-01-24 ENCOUNTER — PATIENT MESSAGE (OUTPATIENT)
Dept: GASTROENTEROLOGY | Facility: CLINIC | Age: 37
End: 2017-01-24

## 2017-01-25 ENCOUNTER — PATIENT MESSAGE (OUTPATIENT)
Dept: GASTROENTEROLOGY | Facility: CLINIC | Age: 37
End: 2017-01-25

## 2017-02-17 ENCOUNTER — OFFICE VISIT (OUTPATIENT)
Dept: FAMILY MEDICINE | Facility: CLINIC | Age: 37
End: 2017-02-17
Payer: OTHER GOVERNMENT

## 2017-02-17 VITALS
HEART RATE: 99 BPM | OXYGEN SATURATION: 96 % | WEIGHT: 213.81 LBS | DIASTOLIC BLOOD PRESSURE: 78 MMHG | BODY MASS INDEX: 34.36 KG/M2 | SYSTOLIC BLOOD PRESSURE: 110 MMHG | TEMPERATURE: 97 F | HEIGHT: 66 IN

## 2017-02-17 DIAGNOSIS — F32.A ANXIETY AND DEPRESSION: ICD-10-CM

## 2017-02-17 DIAGNOSIS — E66.9 OBESITY (BMI 30-39.9): ICD-10-CM

## 2017-02-17 DIAGNOSIS — M25.511 RIGHT SHOULDER PAIN, UNSPECIFIED CHRONICITY: ICD-10-CM

## 2017-02-17 DIAGNOSIS — K58.0 IRRITABLE BOWEL SYNDROME WITH DIARRHEA: ICD-10-CM

## 2017-02-17 DIAGNOSIS — G47.63 SLEEP RELATED BRUXISM: ICD-10-CM

## 2017-02-17 DIAGNOSIS — Z00.00 ANNUAL PHYSICAL EXAM: Primary | ICD-10-CM

## 2017-02-17 DIAGNOSIS — F41.9 ANXIETY AND DEPRESSION: ICD-10-CM

## 2017-02-17 PROCEDURE — 99213 OFFICE O/P EST LOW 20 MIN: CPT | Mod: PBBFAC,PO | Performed by: FAMILY MEDICINE

## 2017-02-17 PROCEDURE — 99395 PREV VISIT EST AGE 18-39: CPT | Mod: S$PBB,,, | Performed by: FAMILY MEDICINE

## 2017-02-17 PROCEDURE — 99999 PR PBB SHADOW E&M-EST. PATIENT-LVL III: CPT | Mod: PBBFAC,,, | Performed by: FAMILY MEDICINE

## 2017-02-17 NOTE — MR AVS SNAPSHOT
[facility]  [address]  [facility] [address]  Phone: [phone]  Fax: [phone]                  David Petersen   2017 11:20 AM   Office Visit    Description:  Male : 1980   Provider:  Janice Valverde MD   Department:  [facility]           Reason for Visit     Annual Exam           Diagnoses this Visit        Comments    Annual physical exam    -  Primary     Irritable bowel syndrome with diarrhea         Anxiety and depression         Sleep related bruxism         Right shoulder pain, unspecified chronicity         Obesity (BMI 30-39.9)                To Do List           Future Appointments        Provider Department Dept Phone    2017 9:50 AM LABORATORY, [facility] (5 Years of Data)     None      [facility] On [facility]     [facility] On Call Nurse Care Line -  Assistance  Registered nurses in the [facility] On Call Center provide clinical advisement, health education, appointment booking, and other advisory services.  Call for this free service at [phone].             Medications                Verify that the below list of medications is an accurate representation of the medications you are currently taking.  If none reported, the list may be blank. If incorrect, please contact your healthcare provider. Carry this list with you in case of emergency.           Current Medications     alprazolam (XANAX) 0.5 MG tablet TAKE 1 TABLET BY MOUTH AS NEEDED FOR ANXIETY    buPROPion (WELLBUTRIN XL) 150 MG TB24 tablet Take 150 mg by mouth once daily.    sertraline (ZOLOFT) 50 MG tablet TAKE 1 TABLET BY MOUTH EVERY DAY WITH 100MG    azithromycin (Z-CARLOS) 250 MG tablet Follow instructions on pack.    ondansetron (ZOFRAN-ODT) 8 MG TbDL Take 1 tablet (8 mg total) by mouth 3 (three) times daily.    potassium chloride SA (K-DUR,KLOR-CON) 20 MEQ tablet Take 1 tab po once daily for 3 days          "  Clinical Reference Information           Your Vitals Were     BP Pulse Temp Height Weight SpO2    110/78 99 97 °F (36.1 °C) (Tympanic) 5' 6" (1.676 m) 97 kg (213 lb 12.8 oz) 96%    BMI                34.51 kg/m2          Blood Pressure          Most Recent Value    BP  110/78      Allergies as of 2/17/2017     Penicillins      Immunizations Administered on Date of Encounter - 2/17/2017     None      Orders Placed During Today's Visit     Future Labs/Procedures Expected by Expires    Comprehensive metabolic panel  2/17/2017 4/18/2018    Lipid panel  2/17/2017 4/18/2018      Instructions      Prevention Guidelines, Men Ages 18 to 39  Screening tests and vaccines are an important part of managing your health. Health counseling is essential, too. Below are guidelines for these, for men ages 18 to 39. Talk with your healthcare provider to make sure youre up-to-date on what you need.  Screening Who needs it How often   Alcohol misuse All men in this age group At routine exams   Blood pressure All men in this age group Every 2 years if your blood pressure is less than 120/80 mm Hg; yearly if your systolic blood pressure is 120 to 139 mm Hg, or your diastolic blood pressure reading is 80 to 89 mm Hg   Depression All men in this age group At routine exams   Diabetes mellitus, type 2 Adults who have no symptoms but are overweight or obese and have 1 or more other risk factors for diabetes At least every 3 years   Hepatitis C If at increased risk At routine exams   High cholesterol or triglycerides All men ages 35 and older, and younger men at high risk for coronary artery disease At least every 5 years   HIV All men At routine exams   Obesity All men in this age group At routine exams   Syphilis Men at increased risk for infection - talk with your healthcare provider At routine exams   Tuberculosis Men at increased risk for infection - talk with your healthcare provider Check with your healthcare provider   Vision All men " in this age group Every 5 to 10 years if no risk factors for eye disease   Vaccines1 Who needs it How often   Chickenpox (varicella) All men in this age group who have no record of this infection or vaccine 2 doses; the second dose should be given at least 4 weeks after the first dose   Hepatitis A Men at increased risk for infection - talk with your healthcare provider 2 doses given at least 6 months apart   Hepatitis B Men at increased risk for infection - talk with your healthcare provider 3 doses over 6 months; second dose should be given 1 month after the first dose; the third dose should be given at least 2 months after the second dose and at least 4 months after the first dose   Haemophilus influenzae Type B (HIB) Men at increased risk for infection - talk with your healthcare provider 1 to 3 doses   Human papillomavirus (HPV4) All men through age 21 years  Men ages 22 to 26 who are at risk 3 doses; the second dose should be given 1 to 2 months after the first dose and the third dose given 6 months after the first dose   Influenza (flu) All men in this age group Once a year   Measles, mumps, rubella (MMR) All men in this age group who have no record of these infections or vaccines 1 or 2 doses through age 55   Meningococcal Men at increased risk for infection - talk with your healthcare provider 1 or more doses   Pneumococca (PCV13) and Pneumococcal (PPSV23) Men at increased risk for infection - talk with your healthcare provider PCV13: 1 dose ages 19 to 65 (protects against 13 types of pneumococcal bacteria)  PPSV23: 1 to 2 doses through age 64, or 1 dose at 65 or older (protects against 23 types of pneumococcal bacteria)   Tetanus/diphtheria/pertussis (Td/Tdap) booster All men in this age group A one-time Tdap booster after age 18, then Td every10 years   Counseling Who needs it How often   Diet and exercise Overweight or obese people When diagnosed, and then at routine exams   Use of tobacco and the health  affects it can cause All men in this age group Every visit   Sexually transmitted infection prevention Men who are sexually active At routine exams   Skin cancer Prevention of skin cancer in fair-skinned adults through age 24 At routine exams   1Those who are 18 years of age, who are not up-to-date on their childhood immunizations, should receive all appropriate catch-up vaccines recommended by the CDC.  Date Last Reviewed: 3/30/2015  © 6582-7967 [employer]. [address] [place] [address], [place] [address]. All rights reserved. This information is not intended as a substitute for professional medical care. Always follow your healthcare professional's instructions.             Language Assistance Services     ATTENTION: Language assistance services are available, free of charge. Please call [phone].      ATENCIÓN: Si katerinla manfred, tiene a watkins disposición servicios gratuitos de asistencia lingüística. Llame al [phone].     CHÚ Ý: N?u b?n nói Ti?ng Vi?t, có các d?ch v? h? tr? ngôn ng? mi?n phí dành cho b?n. G?i s? [phone].         [facility] complies with applicable Federal civil rights laws and does not discriminate on the basis of race, color, national origin, age, disability, or sex.
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
## 2017-02-17 NOTE — PATIENT INSTRUCTIONS
Prevention Guidelines, Men Ages 18 to 39  Screening tests and vaccines are an important part of managing your health. Health counseling is essential, too. Below are guidelines for these, for men ages 18 to 39. Talk with your healthcare provider to make sure youre up-to-date on what you need.  Screening Who needs it How often   Alcohol misuse All men in this age group At routine exams   Blood pressure All men in this age group Every 2 years if your blood pressure is less than 120/80 mm Hg; yearly if your systolic blood pressure is 120 to 139 mm Hg, or your diastolic blood pressure reading is 80 to 89 mm Hg   Depression All men in this age group At routine exams   Diabetes mellitus, type 2 Adults who have no symptoms but are overweight or obese and have 1 or more other risk factors for diabetes At least every 3 years   Hepatitis C If at increased risk At routine exams   High cholesterol or triglycerides All men ages 35 and older, and younger men at high risk for coronary artery disease At least every 5 years   HIV All men At routine exams   Obesity All men in this age group At routine exams   Syphilis Men at increased risk for infection - talk with your healthcare provider At routine exams   Tuberculosis Men at increased risk for infection - talk with your healthcare provider Check with your healthcare provider   Vision All men in this age group Every 5 to 10 years if no risk factors for eye disease   Vaccines1 Who needs it How often   Chickenpox (varicella) All men in this age group who have no record of this infection or vaccine 2 doses; the second dose should be given at least 4 weeks after the first dose   Hepatitis A Men at increased risk for infection - talk with your healthcare provider 2 doses given at least 6 months apart   Hepatitis B Men at increased risk for infection - talk with your healthcare provider 3 doses over 6 months; second dose should be given 1 month after the first dose; the third dose  should be given at least 2 months after the second dose and at least 4 months after the first dose   Haemophilus influenzae Type B (HIB) Men at increased risk for infection - talk with your healthcare provider 1 to 3 doses   Human papillomavirus (HPV4) All men through age 21 years  Men ages 22 to 26 who are at risk 3 doses; the second dose should be given 1 to 2 months after the first dose and the third dose given 6 months after the first dose   Influenza (flu) All men in this age group Once a year   Measles, mumps, rubella (MMR) All men in this age group who have no record of these infections or vaccines 1 or 2 doses through age 55   Meningococcal Men at increased risk for infection - talk with your healthcare provider 1 or more doses   Pneumococca (PCV13) and Pneumococcal (PPSV23) Men at increased risk for infection - talk with your healthcare provider PCV13: 1 dose ages 19 to 65 (protects against 13 types of pneumococcal bacteria)  PPSV23: 1 to 2 doses through age 64, or 1 dose at 65 or older (protects against 23 types of pneumococcal bacteria)   Tetanus/diphtheria/pertussis (Td/Tdap) booster All men in this age group A one-time Tdap booster after age 18, then Td every10 years   Counseling Who needs it How often   Diet and exercise Overweight or obese people When diagnosed, and then at routine exams   Use of tobacco and the health affects it can cause All men in this age group Every visit   Sexually transmitted infection prevention Men who are sexually active At routine exams   Skin cancer Prevention of skin cancer in fair-skinned adults through age 24 At routine exams   1Those who are 18 years of age, who are not up-to-date on their childhood immunizations, should receive all appropriate catch-up vaccines recommended by the CDC.  Date Last Reviewed: 3/30/2015  © 8183-7493 [employer]. [address] [place] [address], [place] [address]. All rights reserved. This information is not intended as a substitute  for professional medical care. Always follow your healthcare professional's instructions.

## 2017-03-09 ENCOUNTER — HOSPITAL ENCOUNTER (EMERGENCY)
Facility: HOSPITAL | Age: 37
Discharge: PSYCHIATRIC OR SUBSTANCE USE FACILITY | End: 2017-03-09
Attending: EMERGENCY MEDICINE
Payer: OTHER GOVERNMENT

## 2017-03-09 VITALS
WEIGHT: 195 LBS | HEIGHT: 67 IN | OXYGEN SATURATION: 96 % | HEART RATE: 111 BPM | TEMPERATURE: 97 F | DIASTOLIC BLOOD PRESSURE: 79 MMHG | BODY MASS INDEX: 30.61 KG/M2 | SYSTOLIC BLOOD PRESSURE: 121 MMHG | RESPIRATION RATE: 18 BRPM

## 2017-03-09 DIAGNOSIS — F43.10 PTSD (POST-TRAUMATIC STRESS DISORDER): Primary | ICD-10-CM

## 2017-03-09 LAB
ALBUMIN SERPL BCP-MCNC: 4.1 G/DL
ALP SERPL-CCNC: 79 U/L
ALT SERPL W/O P-5'-P-CCNC: 57 U/L
AMPHET+METHAMPHET UR QL: NEGATIVE
ANION GAP SERPL CALC-SCNC: 13 MMOL/L
APAP SERPL-MCNC: <3 UG/ML
AST SERPL-CCNC: 23 U/L
BARBITURATES UR QL SCN>200 NG/ML: NEGATIVE
BASOPHILS # BLD AUTO: 0.03 K/UL
BASOPHILS NFR BLD: 0.3 %
BENZODIAZ UR QL SCN>200 NG/ML: NORMAL
BILIRUB SERPL-MCNC: 0.9 MG/DL
BUN SERPL-MCNC: 13 MG/DL
BZE UR QL SCN: NEGATIVE
CALCIUM SERPL-MCNC: 9.6 MG/DL
CANNABINOIDS UR QL SCN: NEGATIVE
CHLORIDE SERPL-SCNC: 103 MMOL/L
CO2 SERPL-SCNC: 26 MMOL/L
CREAT SERPL-MCNC: 1 MG/DL
CREAT UR-MCNC: 183.6 MG/DL
DIFFERENTIAL METHOD: ABNORMAL
EOSINOPHIL # BLD AUTO: 0.1 K/UL
EOSINOPHIL NFR BLD: 1.4 %
ERYTHROCYTE [DISTWIDTH] IN BLOOD BY AUTOMATED COUNT: 12.6 %
EST. GFR  (AFRICAN AMERICAN): >60 ML/MIN/1.73 M^2
EST. GFR  (NON AFRICAN AMERICAN): >60 ML/MIN/1.73 M^2
ETHANOL SERPL-MCNC: <10 MG/DL
GLUCOSE SERPL-MCNC: 97 MG/DL
HCT VFR BLD AUTO: 46.3 %
HGB BLD-MCNC: 16.4 G/DL
LYMPHOCYTES # BLD AUTO: 2.1 K/UL
LYMPHOCYTES NFR BLD: 20.7 %
MCH RBC QN AUTO: 31.6 PG
MCHC RBC AUTO-ENTMCNC: 35.4 %
MCV RBC AUTO: 89 FL
METHADONE UR QL SCN>300 NG/ML: NEGATIVE
MONOCYTES # BLD AUTO: 0.7 K/UL
MONOCYTES NFR BLD: 7.2 %
NEUTROPHILS # BLD AUTO: 7 K/UL
NEUTROPHILS NFR BLD: 70.4 %
OPIATES UR QL SCN: NEGATIVE
PCP UR QL SCN>25 NG/ML: NEGATIVE
PLATELET # BLD AUTO: 166 K/UL
PMV BLD AUTO: 9.8 FL
POTASSIUM SERPL-SCNC: 3.9 MMOL/L
PROT SERPL-MCNC: 7.9 G/DL
RBC # BLD AUTO: 5.19 M/UL
SALICYLATES SERPL-MCNC: <5 MG/DL
SODIUM SERPL-SCNC: 142 MMOL/L
T4 FREE SERPL-MCNC: 0.86 NG/DL
TOXICOLOGY INFORMATION: NORMAL
TSH SERPL DL<=0.005 MIU/L-ACNC: 0.33 UIU/ML
WBC # BLD AUTO: 9.96 K/UL

## 2017-03-09 PROCEDURE — 84439 ASSAY OF FREE THYROXINE: CPT

## 2017-03-09 PROCEDURE — 80320 DRUG SCREEN QUANTALCOHOLS: CPT

## 2017-03-09 PROCEDURE — 99244 OFF/OP CNSLTJ NEW/EST MOD 40: CPT | Mod: GT,,, | Performed by: PSYCHIATRY & NEUROLOGY

## 2017-03-09 PROCEDURE — 85025 COMPLETE CBC W/AUTO DIFF WBC: CPT

## 2017-03-09 PROCEDURE — 80053 COMPREHEN METABOLIC PANEL: CPT

## 2017-03-09 PROCEDURE — 82570 ASSAY OF URINE CREATININE: CPT

## 2017-03-09 PROCEDURE — 80329 ANALGESICS NON-OPIOID 1 OR 2: CPT

## 2017-03-09 PROCEDURE — 99285 EMERGENCY DEPT VISIT HI MDM: CPT

## 2017-03-09 PROCEDURE — 80307 DRUG TEST PRSMV CHEM ANLYZR: CPT

## 2017-03-09 PROCEDURE — 84443 ASSAY THYROID STIM HORMONE: CPT

## 2017-03-09 RX ORDER — VENLAFAXINE HYDROCHLORIDE 75 MG/1
75 CAPSULE, EXTENDED RELEASE ORAL DAILY
Status: DISCONTINUED | OUTPATIENT
Start: 2017-03-10 | End: 2017-03-09 | Stop reason: HOSPADM

## 2017-03-09 RX ORDER — HALOPERIDOL 5 MG/ML
5 INJECTION INTRAMUSCULAR EVERY 6 HOURS PRN
Status: DISCONTINUED | OUTPATIENT
Start: 2017-03-09 | End: 2017-03-09 | Stop reason: HOSPADM

## 2017-03-09 RX ORDER — LORAZEPAM 1 MG/1
1 TABLET ORAL EVERY 6 HOURS PRN
Status: DISCONTINUED | OUTPATIENT
Start: 2017-03-09 | End: 2017-03-09 | Stop reason: HOSPADM

## 2017-03-09 RX ORDER — DIPHENHYDRAMINE HYDROCHLORIDE 50 MG/ML
25 INJECTION INTRAMUSCULAR; INTRAVENOUS EVERY 6 HOURS PRN
Status: DISCONTINUED | OUTPATIENT
Start: 2017-03-09 | End: 2017-03-09 | Stop reason: HOSPADM
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
## 2017-03-09 NOTE — ED NOTES
Duffle bag with jeans, 3 shirts, phone , shoes, sandals, boxers x3, sweatpants, pj pants, shorts, small zipped bag with Deoderant, toothpaste/brush, contacts x2, cologne, contact solution/case locked in PEC locker 28

## 2017-03-09 NOTE — ED NOTES
"Belongings secured in PEC locker 28.  Shirt, shorts, sandals, cell phone.     Wallet and other belongings given to wife to take home. PEC forms filled out and placed with chart, Pt rights brochure given to pt.      Patricia Petersen- [phone] Safe word chosen "[employer]"  "

## 2017-03-09 NOTE — ED PROVIDER NOTES
SCRIBE #1 NOTE: I, Rosemarie Mueller, am scribing for, and in the presence of, Richie Conklin MD. I have scribed the entire note.      History      Chief Complaint   Patient presents with    Psychiatric Evaluation     pt states he has been of his PTSD medication for a week and is having some bad thoughts, pts psychiatrist sent him over for PEC, pt states he fears that he is not safe at home and could hurt himself or someone else       Review of patient's allergies indicates:   Allergen Reactions    Penicillins         HPI   HPI    3/9/2017, 2:17 PM   History obtained from the patient      History of Present Illness: David Petersen is a 36 y.o. male patient who presents to the Emergency Department for a psychiatric evaluation. Pt has a hx of PTSD. Pt states that he has been agitated and having some thoughts of hurting others since he stopped taking his Zoloft and Wellbutrin 10 days ago.  Pt was seen by his therapist today, who recommended that pt come to ED and be PEC'd. Sx have been constant and moderate in severity. No modifying factors noted. No associated sx included. Pt denies any fever, chills, CP, SOB, SI, self-injury, sleep disturbance, or hallucinations. No further complaints at this time.       Arrival mode: Personal vehicle      PCP: Janice Valverde MD       Past Medical History:  Past Medical History:   Diagnosis Date    Anxiety     Arthritis     Chronic diarrhea 2/11/2014    Jumper's knee     PTSD (post-traumatic stress disorder)        Past Surgical History:  Past Surgical History:   Procedure Laterality Date    COLONOSCOPY N/A 1/23/2017    Procedure: COLONOSCOPY;  Surgeon: Adrianna Hicks MD;  Location: [facility];  Service: Endoscopy;  Laterality: N/A;    TONSILLECTOMY           Family History:  Family History   Problem Relation Age of Onset    Cancer Mother      leukemia    Diabetes Maternal Grandmother     Diabetes Maternal Grandfather     Diabetes Paternal Grandmother      Diabetes Paternal Grandfather        Social History:  Social History     Social History Main Topics    Smoking status: Never Smoker    Smokeless tobacco: Never Used    Alcohol use Yes      Comment: occasionally    Drug use: No    Sexual activity: Yes     Partners: Female     Birth control/ protection: None       ROS   Review of Systems   Constitutional: Negative for chills, diaphoresis and fever.   HENT: Negative for sore throat.    Respiratory: Negative for shortness of breath.    Cardiovascular: Negative for chest pain.   Gastrointestinal: Negative for diarrhea, nausea and vomiting.   Genitourinary: Negative for dysuria.   Musculoskeletal: Negative for back pain.   Skin: Negative for rash.   Neurological: Negative for dizziness, weakness, light-headedness, numbness and headaches.   Hematological: Does not bruise/bleed easily.   Psychiatric/Behavioral: Positive for agitation. Negative for hallucinations, self-injury, sleep disturbance and suicidal ideas.     Physical Exam    Initial Vitals   BP Pulse Resp Temp SpO2   03/09/17 1412 03/09/17 1412 03/09/17 1412 03/09/17 1412 03/09/17 1412   122/90 104 18 97.4 °F (36.3 °C) 95 %      Physical Exam  Nursing Notes and Vital Signs Reviewed.  Constitutional: Patient is in no acute distress. Awake and alert. Well-developed and well-nourished.  Head: Atraumatic. Normocephalic.  Eyes: PERRL. EOM intact. Conjunctivae are not pale. No scleral icterus.  ENT: Mucous membranes are moist. Oropharynx is clear and symmetric.    Neck: Supple. Full ROM. No lymphadenopathy.  Cardiovascular: Regular rate. Regular rhythm. No murmurs, rubs, or gallops. Distal pulses are 2+ and symmetri.  Pulmonary/Chest: No respiratory distress. Clear to auscultation bilaterally. No wheezing, rales, or rhonchi.  Abdominal: Soft and non-distended.  There is no tenderness.  No rebound, guarding, or rigidity.   Musculoskeletal: Moves all extremities. No obvious deformities.   Skin: Warm and  "dry.  Neurological:  Alert, awake, and appropriate.  Normal speech.  No acute focal neurological deficits are appreciated.  Psychiatric:               Mood and Affect: flat affect              Suicidal Ideations: No              Hallucinations: none    ED Course    Procedures  ED Vital Signs:  Vitals:    03/09/17 1412   BP: (!) 122/90   Pulse: 104   Resp: 18   Temp: 97.4 °F (36.3 °C)   TempSrc: Oral   SpO2: 95%   Weight: 88.5 kg (195 lb)   Height: 5' 7" (1.702 m)       Abnormal Lab Results:  Labs Reviewed   CBC W/ AUTO DIFFERENTIAL - Abnormal; Notable for the following:        Result Value    MCH 31.6 (*)     All other components within normal limits   COMPREHENSIVE METABOLIC PANEL - Abnormal; Notable for the following:     ALT 57 (*)     All other components within normal limits   TSH - Abnormal; Notable for the following:     TSH 0.329 (*)     All other components within normal limits   SALICYLATE LEVEL - Abnormal; Notable for the following:     Salicylate Lvl <5.0 (*)     All other components within normal limits   ACETAMINOPHEN LEVEL - Abnormal; Notable for the following:     Acetaminophen (Tylenol), Serum <3.0 (*)     All other components within normal limits   ALCOHOL,MEDICAL (ETHANOL)   DRUG SCREEN PANEL, URINE EMERGENCY   T4, FREE        All Lab Results:  Results for orders placed or performed during the hospital encounter of 03/09/17   CBC auto differential   Result Value Ref Range    WBC 9.96 3.90 - 12.70 K/uL    RBC 5.19 4.60 - 6.20 M/uL    Hemoglobin 16.4 14.0 - 18.0 g/dL    Hematocrit 46.3 40.0 - 54.0 %    MCV 89 82 - 98 fL    MCH 31.6 (H) 27.0 - 31.0 pg    MCHC 35.4 32.0 - 36.0 %    RDW 12.6 11.5 - 14.5 %    Platelets 166 150 - 350 K/uL    MPV 9.8 9.2 - 12.9 fL    Gran # 7.0 1.8 - 7.7 K/uL    Lymph # 2.1 1.0 - 4.8 K/uL    Mono # 0.7 0.3 - 1.0 K/uL    Eos # 0.1 0.0 - 0.5 K/uL    Baso # 0.03 0.00 - 0.20 K/uL    Gran% 70.4 38.0 - 73.0 %    Lymph% 20.7 18.0 - 48.0 %    Mono% 7.2 4.0 - 15.0 %    Eosinophil% " 1.4 0.0 - 8.0 %    Basophil% 0.3 0.0 - 1.9 %    Differential Method Automated    Comprehensive metabolic panel   Result Value Ref Range    Sodium 142 136 - 145 mmol/L    Potassium 3.9 3.5 - 5.1 mmol/L    Chloride 103 95 - 110 mmol/L    CO2 26 23 - 29 mmol/L    Glucose 97 70 - 110 mg/dL    BUN, Bld 13 6 - 20 mg/dL    Creatinine 1.0 0.5 - 1.4 mg/dL    Calcium 9.6 8.7 - 10.5 mg/dL    Total Protein 7.9 6.0 - 8.4 g/dL    Albumin 4.1 3.5 - 5.2 g/dL    Total Bilirubin 0.9 0.1 - 1.0 mg/dL    Alkaline Phosphatase 79 55 - 135 U/L    AST 23 10 - 40 U/L    ALT 57 (H) 10 - 44 U/L    Anion Gap 13 8 - 16 mmol/L    eGFR if African American >60 >60 mL/min/1.73 m^2    eGFR if non African American >60 >60 mL/min/1.73 m^2   TSH   Result Value Ref Range    TSH 0.329 (L) 0.400 - 4.000 uIU/mL   Ethanol   Result Value Ref Range    Alcohol, Medical, Serum <10 <10 mg/dL   Drug screen panel, emergency   Result Value Ref Range    Benzodiazepines Presumptive Positive     Methadone metabolites Negative     Cocaine (Metab.) Negative     Opiate Scrn, Ur Negative     Barbiturate Screen, Ur Negative     Amphetamine Screen, Ur Negative     THC Negative     Phencyclidine Negative     Creatinine, Random Ur 183.6 23.0 - 375.0 mg/dL    Toxicology Information SEE COMMENT    Salicylate level   Result Value Ref Range    Salicylate Lvl <5.0 (L) 15.0 - 30.0 mg/dL   Acetaminophen level   Result Value Ref Range    Acetaminophen (Tylenol), Serum <3.0 (L) 10.0 - 20.0 ug/mL   T4, free   Result Value Ref Range    Free T4 0.86 0.71 - 1.51 ng/dL         Imaging Results:  Imaging Results     None                  The Emergency Provider reviewed the vital signs and test results, which are outlined above.    ED Discussion     2:24 PM - PEC:  PEC hold placed on patient.     4:11 PM - Re-evaluation: The patient is resting comfortably and is in no acute distress. He has been medically cleared and is awaiting placement/transfer to a psychiatric facility for further  evaluation.     [id number] Transfer of Care: Psychiatric service(s) is/are not available at this facility. Will transfer patient to Psychiatric Facility for Psychiatric Evaluation.  Notified patient and family of need for transfer.  They understand and agree with the plan as discussed. Answered questions at this time.  Patient will be transferred to [place] [facility].  Patient will be transferred by [facility] secondary to a need for secure/protective transport given the nature of the patients illness.  Richie Conklin MD  6:48 PM          ED Medication(s):  Medications   venlafaxine 24 hr capsule 75 mg (not administered)   haloperidol lactate injection 5 mg (not administered)   diphenhydrAMINE injection 25 mg (not administered)   lorazepam tablet 1 mg (not administered)         Medical Decision Making    Medical Decision Making:   Clinical Tests:   Lab Tests: Ordered and Reviewed           Scribe Attestation:   Scribe #1: I performed the above scribed service and the documentation accurately describes the services I performed. I attest to the accuracy of the note.    Attending:   Physician Attestation Statement for Scribe #1: I, Richie Conklin MD, personally performed the services described in this documentation, as scribed by Rosemarie Mueller, in my presence, and it is both accurate and complete.          Clinical Impression       ICD-10-CM ICD-9-CM   1. PTSD (post-traumatic stress disorder) F43.10 309.81       Disposition:   Disposition: Transferred  Condition: Stable         Richie Conklin MD  03/09/17 9034

## 2017-03-14 ENCOUNTER — TELEPHONE (OUTPATIENT)
Dept: FAMILY MEDICINE | Facility: CLINIC | Age: 37
End: 2017-03-14

## 2017-03-14 NOTE — TELEPHONE ENCOUNTER
----- Message from Sang Hwang sent at 3/14/2017  9:38 AM CDT -----  Contact: pt  He's calling in regards to a new RX for a sleep aide, [facility] in [place], please advise, [phone] (home)

## 2017-03-14 NOTE — TELEPHONE ENCOUNTER
"I spoke with patient and he stated that he admitted himself into the hospital March 09,2017 at the "[facility] " in [facility] was discharged March 13, 2017 and has a return jaylyn.  To see DR. Rodriguez next Tuesday and the doctor gave him a script for sleeping pills today. Hospital is to fax records and they adjusted his medication.  Rebecca Doran LPN  "

## 2017-03-22 ENCOUNTER — OFFICE VISIT (OUTPATIENT)
Dept: FAMILY MEDICINE | Facility: CLINIC | Age: 37
End: 2017-03-22
Payer: OTHER GOVERNMENT

## 2017-03-22 VITALS
HEIGHT: 66 IN | DIASTOLIC BLOOD PRESSURE: 80 MMHG | RESPIRATION RATE: 12 BRPM | TEMPERATURE: 98 F | HEART RATE: 99 BPM | OXYGEN SATURATION: 99 % | SYSTOLIC BLOOD PRESSURE: 100 MMHG | WEIGHT: 214.63 LBS | BODY MASS INDEX: 34.49 KG/M2

## 2017-03-22 DIAGNOSIS — M25.511 RIGHT SHOULDER PAIN, UNSPECIFIED CHRONICITY: ICD-10-CM

## 2017-03-22 DIAGNOSIS — F43.10 PTSD (POST-TRAUMATIC STRESS DISORDER): ICD-10-CM

## 2017-03-22 DIAGNOSIS — R73.03 PREDIABETES: Primary | ICD-10-CM

## 2017-03-22 DIAGNOSIS — F32.A ANXIETY AND DEPRESSION: ICD-10-CM

## 2017-03-22 DIAGNOSIS — E66.9 OBESITY (BMI 30-39.9): ICD-10-CM

## 2017-03-22 DIAGNOSIS — F41.9 ANXIETY AND DEPRESSION: ICD-10-CM

## 2017-03-22 PROCEDURE — 99213 OFFICE O/P EST LOW 20 MIN: CPT | Mod: S$PBB,,, | Performed by: FAMILY MEDICINE

## 2017-03-22 PROCEDURE — 99213 OFFICE O/P EST LOW 20 MIN: CPT | Mod: PBBFAC,PO | Performed by: FAMILY MEDICINE

## 2017-03-22 PROCEDURE — 99999 PR PBB SHADOW E&M-EST. PATIENT-LVL III: CPT | Mod: PBBFAC,,, | Performed by: FAMILY MEDICINE

## 2017-03-22 RX ORDER — IBUPROFEN AND FAMOTIDINE 800; 26.6 MG/1; MG/1
TABLET, COATED ORAL
Refills: 3 | COMMUNITY
Start: 2017-02-20

## 2017-03-22 RX ORDER — CYCLOBENZAPRINE HCL 10 MG
TABLET ORAL
Refills: 0 | COMMUNITY
Start: 2017-03-14 | End: 2017-06-01 | Stop reason: SDUPTHER

## 2017-03-22 RX ORDER — TRAZODONE HYDROCHLORIDE 50 MG/1
TABLET ORAL
Refills: 3 | COMMUNITY
Start: 2017-03-14

## 2017-03-22 RX ORDER — LORAZEPAM 1 MG/1
TABLET ORAL
Refills: 3 | COMMUNITY
Start: 2017-03-14

## 2017-03-22 RX ORDER — SERTRALINE HYDROCHLORIDE 100 MG/1
TABLET, FILM COATED ORAL
Refills: 0 | COMMUNITY
Start: 2017-03-13

## 2017-03-22 NOTE — PROGRESS NOTES
"Subjective:       Patient ID: David Petersen is a 36 y.o. male.    Chief Complaint: Hospital Follow Up    HPI   Hospital Follow Up  37yo male presents today for assessment after hospital admission on 3/9/17 secondary to PSTD. The patient notes he was seen by his therapist earlier in the day and had expressed his desire to harm a friend. He admits he was off his usual medication regimen of Wellbutrin and Zoloft for approximately 10 days prior to admission. He was PEC'd and transferred to [place]. His medications were restarted and he had some therapy before discharge. He reports that his Xanax regimen was changed and he is now taking Ativan. There is no report of SI/HI/AH or VH. Right shoulder pain persists post surgery. He is not currently working and this has contributed to his stress. He notes use of Duexis once daily and this has not afforded significant pain relief. Records from the hospitalization demonstrate lab findings consistent with prediabetes. A1c of 6.0 was measured on 3/10/17 along with lipid panel as follows: TC-175, TG-104, HDL-52 and LDL-102.     Review of Systems   Constitutional: Negative for appetite change, fatigue and unexpected weight change.   Respiratory: Negative for chest tightness and shortness of breath.    Cardiovascular: Negative for palpitations.   Gastrointestinal: Negative for abdominal pain, diarrhea and nausea.   Endocrine: Negative for polydipsia, polyphagia and polyuria.   Musculoskeletal: Positive for arthralgias.   Neurological: Negative for weakness and numbness.   Psychiatric/Behavioral: Positive for dysphoric mood. Negative for self-injury, sleep disturbance and suicidal ideas. The patient is nervous/anxious.        Objective:   /80  Pulse 99  Temp 98.3 °F (36.8 °C) (Temporal)   Resp 12  Ht 5' 6" (1.676 m)  Wt 97.3 kg (214 lb 9.9 oz)  SpO2 99%  BMI 34.64 kg/m2  Physical Exam   Constitutional: He is oriented to person, place, and time. He appears " well-developed. No distress.   Obese, non-toxic   HENT:   Head: Normocephalic and atraumatic.   Right Ear: External ear normal.   Left Ear: External ear normal.   Nose: Nose normal.   Mouth/Throat: Oropharynx is clear and moist.   Eyes: Conjunctivae and EOM are normal. Pupils are equal, round, and reactive to light.   Neck: Normal range of motion. Neck supple.   Cardiovascular: Normal rate, regular rhythm and normal heart sounds.    Pulmonary/Chest: Effort normal and breath sounds normal.   Abdominal: Soft. Bowel sounds are normal.   Musculoskeletal: He exhibits no edema.        Right shoulder: He exhibits decreased range of motion, tenderness and pain.   Neurological: He is alert and oriented to person, place, and time.   Skin: He is not diaphoretic.   Psychiatric: His speech is normal and behavior is normal. His mood appears anxious. He exhibits a depressed mood.       Assessment:       1. Prediabetes    2. PTSD (post-traumatic stress disorder)    3. Anxiety and depression    4. Right shoulder pain, unspecified chronicity    5. Obesity (BMI 30-39.9)        Plan:   Prediabetes  Have advised weight loss efforts and dietary modification. Discussed target A1c goal. Recommend updated labs in 3 months with a visit to follow. Low threshold for Metformin if A1c levels have not decreased to <5.7.   -     HEMOGLOBIN A1C; Future; Expected date: 3/22/17    PTSD (post-traumatic stress disorder)  Stable. Reviewed hospital records with the patient. He had a visit with his therapist yesterday and reports weekly follow-up. His Psychiatrist is Dr. Venegas.     Anxiety and depression  Coping mechanisms reviewed. Emphasized need for medication and follow-up compliance- he has acknowledged understanding.    Right shoulder pain, unspecified chronicity  Patient notes persistent pain. Recommend Duexis up to TID for pain relief. He will continue with Flexeril prn.    Obesity (BMI 30-39.9)  Weight loss efforts have been encouraged through  diet and lifestyle measures.    RTC in 3 months with labs prior to the visit.

## 2017-03-22 NOTE — MR AVS SNAPSHOT
[facility]  [address]  [facility] [address]  Phone: [phone]  Fax: [phone]                  David Petersen   3/22/2017 1:40 PM   Office Visit    Description:  Male : 1980   Provider:  Janice Valverde MD   Department:  [facility]           Reason for Visit     ER Follow-up           Diagnoses this Visit        Comments    Prediabetes    -  Primary     PTSD (post-traumatic stress disorder)         Anxiety and depression         Right shoulder pain, unspecified chronicity         Obesity (BMI 30-39.9)                To Do List           Future Appointments        Provider Department Dept Phone    2017 9:30 AM [facility] [place] [facility]     2017 1:00 PM Jancie Valverde MD [facility] [phone]      Goals (5 Years of Data)     None      [facility]     [facility] Care Line -  Assistance  Registered nurses in the [facility] provide clinical advisement, health education, appointment booking, and other advisory services.  Call for this free service at [phone].             Medications           STOP taking these medications     alprazolam (XANAX) 0.5 MG tablet TAKE 1 TABLET BY MOUTH AS NEEDED FOR ANXIETY           Verify that the below list of medications is an accurate representation of the medications you are currently taking.  If none reported, the list may be blank. If incorrect, please contact your healthcare provider. Carry this list with you in case of emergency.           Current Medications     buPROPion (WELLBUTRIN XL) 150 MG TB24 tablet Take 150 mg by mouth once daily.    cyclobenzaprine (FLEXERIL) 10 MG tablet     DUEXIS 800-26.6 mg Tab     lorazepam (ATIVAN) 1 MG tablet     sertraline (ZOLOFT) 100 MG tablet     trazodone (DESYREL) 50 MG tablet            Clinical Reference Information           Your Vitals Were     BP Pulse  "Temp Resp Height Weight    100/80 99 98.3 °F (36.8 °C) (Temporal) 12 5' 6" (1.676 m) 97.3 kg (214 lb 9.9 oz)    SpO2 BMI             99% 34.64 kg/m2         Blood Pressure          Most Recent Value    BP  100/80      Allergies as of 3/22/2017     Penicillins      Immunizations Administered on Date of Encounter - 3/22/2017     None      Orders Placed During Today's Visit     Future Labs/Procedures Expected by Expires    HEMOGLOBIN A1C  3/22/2017 5/21/2018      Language Assistance Services     ATTENTION: Language assistance services are available, free of charge. Please call [phone].      ATENCIÓN: Si habla hectorañol, tiene a watkins disposición servicios gratuitos de asistencia lingüística. Llame al [phone].     CHÚ Ý: N?u b?n nói Ti?ng Vi?t, có các d?ch v? h? tr? ngôn ng? mi?n phí dành cho b?n. G?i s? [phone].         [facility] complies with applicable Federal civil rights laws and does not discriminate on the basis of race, color, national origin, age, disability, or sex.        "

## 2017-04-10 ENCOUNTER — OFFICE VISIT (OUTPATIENT)
Dept: FAMILY MEDICINE | Facility: CLINIC | Age: 37
End: 2017-04-10
Payer: OTHER GOVERNMENT

## 2017-04-10 VITALS
WEIGHT: 216.19 LBS | BODY MASS INDEX: 33.93 KG/M2 | DIASTOLIC BLOOD PRESSURE: 72 MMHG | HEIGHT: 67 IN | SYSTOLIC BLOOD PRESSURE: 120 MMHG | HEART RATE: 76 BPM | TEMPERATURE: 98 F | RESPIRATION RATE: 14 BRPM | OXYGEN SATURATION: 99 %

## 2017-04-10 DIAGNOSIS — M25.511 RIGHT SHOULDER PAIN, UNSPECIFIED CHRONICITY: Primary | ICD-10-CM

## 2017-04-10 DIAGNOSIS — E66.9 OBESITY (BMI 30-39.9): ICD-10-CM

## 2017-04-10 DIAGNOSIS — M62.838 CERVICAL PARASPINAL MUSCLE SPASM: ICD-10-CM

## 2017-04-10 PROCEDURE — 99213 OFFICE O/P EST LOW 20 MIN: CPT | Mod: PBBFAC,PO | Performed by: FAMILY MEDICINE

## 2017-04-10 PROCEDURE — 99213 OFFICE O/P EST LOW 20 MIN: CPT | Mod: S$PBB,,, | Performed by: FAMILY MEDICINE

## 2017-04-10 PROCEDURE — 99999 PR PBB SHADOW E&M-EST. PATIENT-LVL III: CPT | Mod: PBBFAC,,, | Performed by: FAMILY MEDICINE

## 2017-04-10 RX ORDER — METHYLPREDNISOLONE 4 MG/1
TABLET ORAL
Qty: 1 PACKAGE | Refills: 0 | Status: SHIPPED | OUTPATIENT
Start: 2017-04-10 | End: 2017-06-01

## 2017-04-10 NOTE — MR AVS SNAPSHOT
[facility]  [address]  [facility] [address]  Phone: [phone]  Fax: [phone]                  David Petersen   4/10/2017 9:00 AM   Office Visit    Description:  Male : 1980   Provider:  Janice Valverde MD   Department:  [facility]           Reason for Visit     Shoulder Pain           Diagnoses this Visit        Comments    Right shoulder pain, unspecified chronicity    -  Primary     Cervical paraspinal muscle spasm         Obesity (BMI 30-39.9)                To Do List           Future Appointments        Provider Department Dept Phone    2017 9:30 AM [facility] [place] [facility]     2017 1:00 PM Janice Valverde MD [facility] [phone]      Goals (5 Years of Data)     None       These Medications        Disp Refills Start End    methylPREDNISolone (MEDROL DOSEPACK) 4 mg tablet 1 Package 0 4/10/2017     use as directed    Pharmacy: [address] #: [phone]         [facility]     [facility] Care Line -  Assistance  Unless otherwise directed by your provider, please contact [facility], our nurse care line that is available for  assistance.     Registered nurses in the [facility] provide: appointment scheduling, clinical advisement, health education, and other advisory services.  Call: [phone] (toll free)               Medications           START taking these NEW medications        Refills    methylPREDNISolone (MEDROL DOSEPACK) 4 mg tablet 0    Sig: use as directed    Class: Normal           Verify that the below list of medications is an accurate representation of the medications you are currently taking.  If none reported, the list may be blank. If incorrect, please contact your healthcare provider. Carry this list with you in case of emergency.           Current  "Medications     buPROPion (WELLBUTRIN XL) 150 MG TB24 tablet Take 150 mg by mouth once daily.    cyclobenzaprine (FLEXERIL) 10 MG tablet     DUEXIS 800-26.6 mg Tab     lorazepam (ATIVAN) 1 MG tablet     sertraline (ZOLOFT) 100 MG tablet     trazodone (DESYREL) 50 MG tablet     methylPREDNISolone (MEDROL DOSEPACK) 4 mg tablet use as directed           Clinical Reference Information           Your Vitals Were     BP Pulse Temp Resp Height Weight    120/72 76 97.6 °F (36.4 °C) (Temporal) 14 5' 7" (1.702 m) 98.1 kg (216 lb 2.6 oz)    SpO2 BMI             99% 33.86 kg/m2         Blood Pressure          Most Recent Value    BP  120/72      Allergies as of 4/10/2017     Penicillins      Immunizations Administered on Date of Encounter - 4/10/2017     None      Language Assistance Services     ATTENTION: Language assistance services are available, free of charge. Please call [phone].      ATENCIÓN: Si habla manfred, tiene a watkins disposición servicios gratuitos de asistencia lingüística. Llame al [phone].     SANDOVAL Ý: N?u b?n nói Ti?ng Vi?t, có các d?ch v? h? tr? ngôn ng? mi?n phí dành cho b?n. G?i s? [phone].         [facility] complies with applicable Federal civil rights laws and does not discriminate on the basis of race, color, national origin, age, disability, or sex.        "

## 2017-04-10 NOTE — PROGRESS NOTES
"Subjective:       Patient ID: David Petersen is a 36 y.o. male.    Chief Complaint: Shoulder Pain    HPI   Shoulder Pain  35yo male presents today with persistent report of right shoulder pain. He has been assessed per Ortho Dr. Jacobsen for this complaint. No surgical intervention has been performed but he has been participating in PT. He has increased Duexis to TID. He has beent aking Flexeril which is not currently affording significant benefit. This weekend he helped with some heavy lifting in order to earn some money. He is not currently working at present as a result of the shoulder symptoms. He notes contacting the office of his Orthopedist with instructions to be seen here today for pain medication. Pain is rated at 9/10 presently.     Review of Systems   Constitutional: Negative for fatigue and unexpected weight change.   Musculoskeletal: Positive for arthralgias and neck pain. Negative for joint swelling.   Skin: Negative for color change and rash.   Neurological: Negative for weakness and numbness.       Objective:   /72  Pulse 76  Temp 97.6 °F (36.4 °C) (Temporal)   Resp 14  Ht 5' 7" (1.702 m)  Wt 98.1 kg (216 lb 2.6 oz)  SpO2 99%  BMI 33.86 kg/m2  Physical Exam   Constitutional: He appears well-developed. No distress.   Obese, non-toxic   HENT:   Head: Normocephalic and atraumatic.   Mouth/Throat: Oropharynx is clear and moist.   Cardiovascular: Normal rate, regular rhythm and normal heart sounds.    Pulses:       Radial pulses are 2+ on the right side   Pulmonary/Chest: Effort normal and breath sounds normal.   Musculoskeletal:        Right shoulder: He exhibits decreased range of motion, tenderness, pain and decreased strength. He exhibits no bony tenderness and no swelling.        Cervical back: He exhibits spasm. He exhibits normal range of motion, no tenderness and no pain.   Skin: He is not diaphoretic.       Assessment:       1. Right shoulder pain, unspecified chronicity    2. Cervical " paraspinal muscle spasm    3. Obesity (BMI 30-39.9)        Plan:   Right shoulder pain, unspecified chronicity  -     methylPREDNISolone (MEDROL DOSEPACK) 4 mg tablet; use as directed  Dispense: 1 Package; Refill: 0    Cervical paraspinal muscle spasm  -     methylPREDNISolone (MEDROL DOSEPACK) 4 mg tablet; use as directed  Dispense: 1 Package; Refill: 0    Obesity (BMI 30-39.9)  Weight loss efforts remain encouraged.    Contacted Dr. Jacobsen's office today. Patient has undergone a steroid injection with PT and this has been delayed. Will defer RX for pain medication as he was provided with Hydrocodone on 3/29 (#30). Medrol po as above. Monitor for worsening reflux symptoms given recent NSAID use. Hold Medrol po if symptoms occur. Continue with PT and proceed with updated Ortho assessment.

## 2017-06-01 ENCOUNTER — OFFICE VISIT (OUTPATIENT)
Dept: INTERNAL MEDICINE | Facility: CLINIC | Age: 37
End: 2017-06-01
Payer: OTHER GOVERNMENT

## 2017-06-01 VITALS
WEIGHT: 215.81 LBS | BODY MASS INDEX: 33.87 KG/M2 | DIASTOLIC BLOOD PRESSURE: 80 MMHG | HEART RATE: 86 BPM | SYSTOLIC BLOOD PRESSURE: 110 MMHG | TEMPERATURE: 98 F | OXYGEN SATURATION: 96 % | HEIGHT: 67 IN

## 2017-06-01 DIAGNOSIS — K52.9 ENTERITIS: Primary | ICD-10-CM

## 2017-06-01 PROCEDURE — 99999 PR PBB SHADOW E&M-EST. PATIENT-LVL III: CPT | Mod: PBBFAC,,, | Performed by: PHYSICIAN ASSISTANT

## 2017-06-01 PROCEDURE — 99213 OFFICE O/P EST LOW 20 MIN: CPT | Mod: PBBFAC | Performed by: PHYSICIAN ASSISTANT

## 2017-06-01 PROCEDURE — 99213 OFFICE O/P EST LOW 20 MIN: CPT | Mod: S$PBB,,, | Performed by: PHYSICIAN ASSISTANT

## 2017-06-01 RX ORDER — ONDANSETRON 8 MG/1
8 TABLET, ORALLY DISINTEGRATING ORAL 3 TIMES DAILY
Qty: 15 TABLET | Refills: 0 | Status: SHIPPED | OUTPATIENT
Start: 2017-06-01

## 2017-06-01 RX ORDER — CYCLOBENZAPRINE HCL 10 MG
10 TABLET ORAL 3 TIMES DAILY
Qty: 30 TABLET | Refills: 0 | Status: SHIPPED | OUTPATIENT
Start: 2017-06-01

## 2017-06-01 NOTE — PROGRESS NOTES
Subjective:       Patient ID: David Petersen is a 36 y.o. male.    Chief Complaint: Emesis    Emesis    This is a new problem. The current episode started today. The problem has been resolved. The emesis has an appearance of stomach contents. There has been no fever. Associated symptoms include arthralgias. Pertinent negatives include no chest pain, diarrhea, fever, headaches, myalgias, sweats or weight loss. Risk factors include suspect food intake. He has tried nothing for the symptoms.     Review of Systems   Constitutional: Negative for activity change, fever, unexpected weight change and weight loss.   HENT: Negative for hearing loss, rhinorrhea and trouble swallowing.    Eyes: Negative for discharge and visual disturbance.   Respiratory: Negative for chest tightness and wheezing.    Cardiovascular: Negative for chest pain and palpitations.   Gastrointestinal: Positive for vomiting. Negative for blood in stool, constipation and diarrhea.   Endocrine: Negative for polydipsia and polyuria.   Genitourinary: Negative for difficulty urinating, hematuria and urgency.   Musculoskeletal: Positive for arthralgias and joint swelling. Negative for myalgias and neck pain.   Neurological: Negative for weakness and headaches.   Psychiatric/Behavioral: Negative for confusion and dysphoric mood.       Objective:      Physical Exam   Constitutional: He appears well-developed and well-nourished. No distress.   HENT:   Head: Normocephalic and atraumatic.   Cardiovascular: Normal rate and regular rhythm.    Pulmonary/Chest: Effort normal and breath sounds normal.   Abdominal: Soft. Bowel sounds are normal. He exhibits no distension and no mass. There is no tenderness. There is no rebound and no guarding. No hernia.   Skin: He is not diaphoretic.   Nursing note and vitals reviewed.      Assessment:       1. Enteritis        Plan:       Enteritis    Other orders  -     ondansetron (ZOFRAN-ODT) 8 MG TbDL; Take 1 tablet (8 mg total) by  mouth 3 (three) times daily.  Dispense: 15 tablet; Refill: 0  -     cyclobenzaprine (FLEXERIL) 10 MG tablet; Take 1 tablet (10 mg total) by mouth 3 (three) times daily.  Dispense: 30 tablet; Refill: 0

## 2017-06-14 ENCOUNTER — TELEPHONE (OUTPATIENT)
Dept: FAMILY MEDICINE | Facility: CLINIC | Age: 37
End: 2017-06-14

## 2017-06-14 NOTE — TELEPHONE ENCOUNTER
----- Message from Jefferson Canas sent at 6/14/2017 11:59 AM CDT -----  Contact: [facility] [phone]  [facility] is requesting a call back concerning the medical notes for the pt's visit on 4/10/17.

## 2017-06-15 ENCOUNTER — TELEPHONE (OUTPATIENT)
Dept: FAMILY MEDICINE | Facility: CLINIC | Age: 37
End: 2017-06-15

## 2017-06-15 NOTE — TELEPHONE ENCOUNTER
----- Message from Nelly Cerna sent at 6/15/2017 10:09 AM CDT -----  Contact: [facility]  [facility] request a call back at  [phone], Regards to patient shoulder injury.    Thanks  td

## 2017-06-30 ENCOUNTER — TELEPHONE (OUTPATIENT)
Dept: FAMILY MEDICINE | Facility: CLINIC | Age: 37
End: 2017-06-30

## 2017-06-30 NOTE — TELEPHONE ENCOUNTER
Pt stated that he had surgery yesterday an that he was unable to come to appt, pt will call to scheduled appt

## 2017-06-30 NOTE — TELEPHONE ENCOUNTER
----- Message from Enedina Lopez sent at 6/30/2017 11:35 AM CDT -----  Patient states he have questions/concerns. Please adv/call [phone].//thanks. cw

## 2024-02-05 DIAGNOSIS — R41.3 MEMORY DEFICIT: Primary | ICD-10-CM

## 2024-02-05 DIAGNOSIS — S06.9XAS TRAUMATIC BRAIN INJURY, WITH UNKNOWN LOSS OF CONSCIOUSNESS STATUS, SEQUELA: ICD-10-CM

## 2024-05-13 ENCOUNTER — HOSPITAL ENCOUNTER (EMERGENCY)
Facility: HOSPITAL | Age: 44
Discharge: HOME | End: 2024-05-13
Attending: EMERGENCY MEDICINE
Payer: OTHER GOVERNMENT

## 2024-05-13 VITALS
BODY MASS INDEX: 28.25 KG/M2 | RESPIRATION RATE: 19 BRPM | WEIGHT: 180 LBS | HEART RATE: 96 BPM | HEIGHT: 67 IN | SYSTOLIC BLOOD PRESSURE: 123 MMHG | OXYGEN SATURATION: 97 % | DIASTOLIC BLOOD PRESSURE: 75 MMHG | TEMPERATURE: 98 F

## 2024-05-13 DIAGNOSIS — M79.602 LEFT ARM PAIN: Primary | ICD-10-CM

## 2024-05-13 PROCEDURE — 99284 EMERGENCY DEPT VISIT MOD MDM: CPT | Mod: 25

## 2024-05-13 PROCEDURE — 25000003 PHARM REV CODE 250: Performed by: PHYSICIAN ASSISTANT

## 2024-05-13 RX ORDER — METHYLPREDNISOLONE 4 MG/1
TABLET ORAL
Qty: 21 EACH | Refills: 0 | Status: SHIPPED | OUTPATIENT
Start: 2024-05-13 | End: 2024-06-03

## 2024-05-13 RX ORDER — HYDROCODONE BITARTRATE AND ACETAMINOPHEN 5; 325 MG/1; MG/1
1 TABLET ORAL EVERY 6 HOURS PRN
Qty: 12 TABLET | Refills: 0 | Status: SHIPPED | OUTPATIENT
Start: 2024-05-13

## 2024-05-13 RX ORDER — HYDROCODONE BITARTRATE AND ACETAMINOPHEN 7.5; 325 MG/1; MG/1
1 TABLET ORAL
Status: COMPLETED | OUTPATIENT
Start: 2024-05-13 | End: 2024-05-13

## 2024-05-13 RX ADMIN — HYDROCODONE BITARTRATE AND ACETAMINOPHEN 1 TABLET: 7.5; 325 TABLET ORAL at 03:05

## 2024-05-13 NOTE — ED PROVIDER NOTES
Encounter Date: 5/13/2024       History     Chief Complaint   Patient presents with    Fall     Patient reports having a night terror last Sunday due to PTSD and falling out of bed landing on L arm. C/o L upper arm pain/shoulder. Worse w movement. Seen at urgent care Friday, medications are not relieving pain.      43 y.o. male presents to the ED with left upper arm/shoulder pain s/t fall onset 1 week ago. Patient states that he fell out of bed on 5/5 after having a night terror. States he was seen at urgent care on 5/10 where he was given toradol and steroid shot and sent home with indomethacin and flexeril without relief. Denies numbness, tingling, weakness. States it feels like a pinched nerve and was hoping to get MRI of the arm today.     The history is provided by the patient. No  was used.     Review of patient's allergies indicates:   Allergen Reactions    Penicillins Anaphylaxis     Past Medical History:   Diagnosis Date    Anxiety     Arthritis     Chronic diarrhea 2/11/2014    Jumper's knee     PTSD (post-traumatic stress disorder)      Past Surgical History:   Procedure Laterality Date    COLONOSCOPY N/A 1/23/2017    Procedure: COLONOSCOPY;  Surgeon: Adrinana Hicks MD;  Location: [facility];  Service: Endoscopy;  Laterality: N/A;    TONSILLECTOMY       Family History   Problem Relation Name Age of Onset    Cancer Mother          leukemia    Diabetes Maternal Grandmother      Diabetes Maternal Grandfather      Diabetes Paternal Grandmother      Diabetes Paternal Grandfather       Social History     Tobacco Use    Smoking status: Never    Smokeless tobacco: Never   Substance Use Topics    Alcohol use: Yes     Comment: occasionally    Drug use: No     Review of Systems   Constitutional:  Negative for fever.   HENT:  Negative for sore throat.    Respiratory:  Negative for shortness of breath.    Cardiovascular:  Negative for chest pain.   Gastrointestinal:  Negative for nausea.    Genitourinary:  Negative for dysuria.   Musculoskeletal:  Positive for myalgias. Negative for back pain.   Skin:  Negative for rash.   Neurological:  Negative for weakness.   Hematological:  Does not bruise/bleed easily.   All other systems reviewed and are negative.      Physical Exam     Initial Vitals [05/13/24 1337]   BP Pulse Resp Temp SpO2   (!) 135/90 98 19 98.1 °F (36.7 °C) 97 %      MAP       --         Physical Exam    Nursing note and vitals reviewed.  Constitutional: He appears well-developed and well-nourished.   HENT:   Head: Normocephalic and atraumatic.   Eyes: EOM are normal. Pupils are equal, round, and reactive to light.   Neck: Neck supple.   Normal range of motion.  Cardiovascular:  Normal rate, regular rhythm, normal heart sounds and intact distal pulses.           Pulmonary/Chest: Breath sounds normal.   Musculoskeletal:         General: Normal range of motion.        Arms:       Cervical back: Normal range of motion and neck supple.     Neurological: He is alert and oriented to person, place, and time. He has normal strength. GCS score is 15. GCS eye subscore is 4. GCS verbal subscore is 5. GCS motor subscore is 6.   Skin: Skin is warm and dry. Capillary refill takes less than 2 seconds.   Psychiatric: He has a normal mood and affect.         ED Course   Procedures  Labs Reviewed - No data to display       Imaging Results              X-Ray Shoulder 2 or More Views Left (Final result)  Result time 05/13/24 14:09:01      Final result by Maik Babcock MD (05/13/24 14:09:01)                   Impression:      Degenerative changes.      Electronically signed by: Maik Babcock  Date:    05/13/2024  Time:    14:09               Narrative:    EXAMINATION:  XR SHOULDER COMPLETE 2 OR MORE VIEWS LEFT    CLINICAL HISTORY:  L shoulder pain, fall;    COMPARISON:  None.    FINDINGS:  No acute displaced fractures or dislocations.    There is minimal narrowing of the acromioclavicular joint  glenohumeral joint essentially preserved with smooth articular surfaces no other significant abnormality identified    No blastic or lytic lesions.    Soft tissues within normal limits.                                       Medications   HYDROcodone-acetaminophen 7.5-325 mg per tablet 1 tablet (1 tablet Oral Given 5/13/24 1508)     Medical Decision Making  Differential diagnosis: muscle strain vs spasm, ligament vs tendon injury    43 y.o. male presents to the ED with left upper arm/shoulder pain s/t fall onset 1 week ago. Patient states that he fell out of bed on 5/5 after having a night terror. States he was seen at urgent care on 5/10 where he was given toradol and steroid shot and sent home with indomethacin and flexeril without relief. Denies numbness, tingling, weakness. States it feels like a pinched nerve and was hoping to get MRI of the arm today.       Amount and/or Complexity of Data Reviewed  Radiology: ordered.    Risk  Prescription drug management.               ED Course as of 05/13/24 1611   Mon May 13, 2024   1506 Discussed benign imaging with patient.  Will give dose of pain medicine here and sent home with short course of pain medication as well as a steroid.  Do not want to give another dose of IM steroids due to recent dose on Friday.  Will refer to orthopedic clinic for further evaluation. [MJ]      ED Course User Index  [MJ] Ced Cummins PA-C                           Clinical Impression:  Final diagnoses:  [M79.602] Left arm pain (Primary)          ED Disposition Condition    Discharge Stable          ED Prescriptions       Medication Sig Dispense Start Date End Date Auth. Provider    HYDROcodone-acetaminophen (NORCO) 5-325 mg per tablet Take 1 tablet by mouth every 6 (six) hours as needed for Pain. 12 tablet 5/13/2024 -- Ced Cummins PA-C    methylPREDNISolone (MEDROL DOSEPACK) 4 mg tablet use as directed 21 each 5/13/2024 6/3/2024 Ced Cummins PA-C          Follow-up  Information       Follow up With Specialties Details Why Contact Info    Primary Care Provider  Call  Call the number above and they will get you scheduled with a primary care provider within 48 hours for an appointment [phone]    [facility]   Referral has been sent on your behalf; they will call to schedule follow-up appointment [phone]             Ced Cummins PA-C  05/13/24 2411       Ced Cummins PA-C  05/13/24 7034